# Patient Record
Sex: FEMALE | Race: WHITE | NOT HISPANIC OR LATINO | Employment: FULL TIME | URBAN - METROPOLITAN AREA
[De-identification: names, ages, dates, MRNs, and addresses within clinical notes are randomized per-mention and may not be internally consistent; named-entity substitution may affect disease eponyms.]

---

## 2023-07-31 LAB
RBC, URINE: 1 /HPF (ref 0–5)
SQUAMOUS EPITHELIAL CELLS, URINE: 7 /HPF
WBC, URINE: 5 /HPF (ref 0–5)

## 2023-08-01 LAB — URINE CULTURE: NO GROWTH

## 2024-01-29 DIAGNOSIS — I10 BENIGN HYPERTENSION: Primary | ICD-10-CM

## 2024-02-01 RX ORDER — LISINOPRIL 5 MG/1
5 TABLET ORAL DAILY
Qty: 90 TABLET | Refills: 0 | Status: SHIPPED | OUTPATIENT
Start: 2024-02-01 | End: 2024-04-29

## 2024-02-02 NOTE — TELEPHONE ENCOUNTER
Patient requesting medication refill for     Lisinopril 5mg     Pharmacy [facility] home delivery

## 2024-02-02 NOTE — TELEPHONE ENCOUNTER
Patient had referral for bone density test. Patient stated the cost of the test is too high. She wanted to know if there is any other test that would be cheaper similar to test referred.  She would like to know how important is this test?  If she can go without doing this test.

## 2024-02-28 ENCOUNTER — OFFICE VISIT (OUTPATIENT)
Dept: PRIMARY CARE | Facility: CLINIC | Age: 64
End: 2024-02-28
Payer: COMMERCIAL

## 2024-02-28 VITALS
SYSTOLIC BLOOD PRESSURE: 114 MMHG | HEIGHT: 62 IN | BODY MASS INDEX: 24.03 KG/M2 | RESPIRATION RATE: 16 BRPM | WEIGHT: 130.6 LBS | TEMPERATURE: 98.1 F | OXYGEN SATURATION: 95 % | DIASTOLIC BLOOD PRESSURE: 74 MMHG | HEART RATE: 84 BPM

## 2024-02-28 DIAGNOSIS — F17.200 SMOKER: ICD-10-CM

## 2024-02-28 DIAGNOSIS — M79.605 PAIN OF LEFT LOWER EXTREMITY: Primary | ICD-10-CM

## 2024-02-28 PROCEDURE — 99213 OFFICE O/P EST LOW 20 MIN: CPT | Performed by: NURSE PRACTITIONER

## 2024-02-28 PROCEDURE — 1126F AMNT PAIN NOTED NONE PRSNT: CPT | Performed by: NURSE PRACTITIONER

## 2024-02-28 PROCEDURE — 99213 OFFICE O/P EST LOW 20 MIN: CPT | Mod: ZK | Performed by: NURSE PRACTITIONER

## 2024-02-28 PROCEDURE — 1160F RVW MEDS BY RX/DR IN RCRD: CPT | Performed by: NURSE PRACTITIONER

## 2024-02-28 PROCEDURE — 1159F MED LIST DOCD IN RCRD: CPT | Performed by: NURSE PRACTITIONER

## 2024-02-28 RX ORDER — LANOLIN ALCOHOL/MO/W.PET/CERES
CREAM (GRAM) TOPICAL
COMMUNITY

## 2024-02-28 RX ORDER — IBUPROFEN 100 MG/5ML
1 SUSPENSION, ORAL (FINAL DOSE FORM) ORAL DAILY
COMMUNITY

## 2024-02-28 RX ORDER — ACETAMINOPHEN 500 MG
TABLET ORAL
COMMUNITY
Start: 2010-02-17

## 2024-02-28 ASSESSMENT — ENCOUNTER SYMPTOMS
OCCASIONAL FEELINGS OF UNSTEADINESS: 0
LOSS OF SENSATION IN FEET: 0
DEPRESSION: 0

## 2024-02-28 ASSESSMENT — LIFESTYLE VARIABLES
SKIP TO QUESTIONS 9-10: 1
HOW OFTEN DO YOU HAVE SIX OR MORE DRINKS ON ONE OCCASION: NEVER
AUDIT-C TOTAL SCORE: 1
HOW MANY STANDARD DRINKS CONTAINING ALCOHOL DO YOU HAVE ON A TYPICAL DAY: 1 OR 2
HOW OFTEN DO YOU HAVE A DRINK CONTAINING ALCOHOL: MONTHLY OR LESS

## 2024-02-28 ASSESSMENT — COLUMBIA-SUICIDE SEVERITY RATING SCALE - C-SSRS
2. HAVE YOU ACTUALLY HAD ANY THOUGHTS OF KILLING YOURSELF?: NO
6. HAVE YOU EVER DONE ANYTHING, STARTED TO DO ANYTHING, OR PREPARED TO DO ANYTHING TO END YOUR LIFE?: NO
1. IN THE PAST MONTH, HAVE YOU WISHED YOU WERE DEAD OR WISHED YOU COULD GO TO SLEEP AND NOT WAKE UP?: NO

## 2024-02-28 ASSESSMENT — PATIENT HEALTH QUESTIONNAIRE - PHQ9
1. LITTLE INTEREST OR PLEASURE IN DOING THINGS: NOT AT ALL
SUM OF ALL RESPONSES TO PHQ9 QUESTIONS 1 AND 2: 0
2. FEELING DOWN, DEPRESSED OR HOPELESS: NOT AT ALL

## 2024-02-28 ASSESSMENT — PAIN SCALES - GENERAL: PAINLEVEL: 0-NO PAIN

## 2024-02-28 NOTE — PROGRESS NOTES
Subjective   Patient ID: Lilly Nina is a 64 y.o. female who presents for Pain (Sick visit patient c/o pain to left lower leg. Patient denied fall , denied swelling, patient stated pain started couple months ago, its intermittent, it comes and goes.).  HPI 64-year-old female presents today for left lower leg pain scribed as intermittent flares of deep pain to the mid lateral shin area.  It has woken her up in the middle of the night.  Symptoms seem to be increasing in frequency given length of time.  Patient denies trauma.  No bruising redness open wounds or obvious deformity noted.    She is a smoker.  Concern raised for vascular issues.  States this is not like restless leg or muscle cramping.  She does use mustard as needed for muscle cramps.  States this is sharp and stabbing lasting several minutes.    Review of Systems  Review of systems: Present-feeling well. Not present-chills, fatigue and fever.  Skin: Not present-new lesions and rash.  HEENT: Not present-headache, ear pain, nasal congestion, and sore throat.  Neck: Not present-neck pain, neck stiffness and swollen glands.  Respiratory: Not present-difficulty breathing, cough, bloody sputum.  Cardiovascular: leg pain.  Not present-abnormal blood pressure, chest pain, edema, fainting,  leg swelling, palpitations, dyspnea on exertion, shortness of breath and slow heart rate.  Gastrointestinal: Not present-abdominal pain, bloody or very black stools, changes in bowel habits, heartburn, incontinence of stool, jaundice, nausea and vomiting.  Genitourinary: Not present-change in bladder habits, hematuria, flank pain, frequency, urinary incontinence, urgency and dysuria.  Musculoskeletal: Not present-back pain, claudication, joint pain, joint swelling, joint redness, and muscular weakness.  Neurological: Not present-dizziness, headache, trouble walking, unsteadiness, vertigo, weakness, numbness or tingling.  Objective   /74   Pulse 84   Temp 36.7 °C  "(98.1 °F) (Temporal)   Resp 16   Ht 1.575 m (5' 2\")   Wt 59.2 kg (130 lb 9.6 oz)   SpO2 95%   BMI 23.89 kg/m²      Physical Exam  Gen.: Mental status-alert. Gen. appearance-cooperative, well groomed and consistent with stated age. Not in acute distress or sickly. Orientation-oriented to time, place, purpose and person. Build and nutrition-well-nourished and well-developed. Hydration-well-hydrated.    Integumentary: General characteristics-overall examination the patient´s skin reveals-no suspicious lesions, no bruises and no evidence of scars. Color-normal coloration skin. Skin moisture-normal skin moisture.    Head and neck: Head-normocephalic, atraumatic with no lesions or palpable masses. Face-atraumatic. Neck-full range of motion and subtle. No lymphadenopathy and no nuchal rigidity. Thyroid-normal size and consistency with no palpable lumps.    Chest and lung exam: Auscultation-normal breath sounds, no adventitious lung sounds and normal vocal resonance. Chest wall is normal in shape and non-tender.    Cardiovascular:  Auscultation: Regular rate and rhythm. Heart sounds-normal heart sounds, S1-S2. No murmurs or gallops appreciated. Carotid arteries normal and without bruit.    Peripheral vascular: Lower extremity-inspection is normal bilaterally. Normal temperature and no edema bilaterally.    Musculoskeletal: Examination of the spine with no step-offs or point tenderness.  Full range of motion of the spine without pain or difficulty. Right lower extremity-normal strength and tone, normal range of motion without pain. Left lower extremity-normal strength and tone, normal range of motion without pain.  2+ pulses and reflexes.  No evidence of obvious deformity or redness or warmth or bruising.  Pain is not reproducible at this time.    Assessment/Plan   Diagnoses and all orders for this visit:  Pain of left lower extremity  -     Basic metabolic panel; Future  -     Magnesium; Future  -     Vascular US lower " extremity arterial duplex bilateral with LULA; Future  -     Vascular US lower extremity venous duplex bilateral; Future  Smoker  -     Basic metabolic panel; Future  -     Vascular US lower extremity arterial duplex bilateral with LULA; Future  -     Vascular US lower extremity venous duplex bilateral; Future

## 2024-03-01 NOTE — PATIENT INSTRUCTIONS
Unexplained left lower leg pain.  Pain is not present on exam today.  Is not reproducible.  There is no redness swelling warmth or obvious trauma.  Recommend BMP and magnesium levels.  She will be notified of results as they become available.  Lilly is a smoker.  She was encouraged to quit.  Vascular studies to be obtained.  She will be notified of results as they become available.    Follow-up in 1 to 2 weeks or sooner as needed.

## 2024-03-21 ENCOUNTER — LAB (OUTPATIENT)
Dept: LAB | Facility: LAB | Age: 64
End: 2024-03-21
Payer: COMMERCIAL

## 2024-03-21 ENCOUNTER — HOSPITAL ENCOUNTER (OUTPATIENT)
Dept: VASCULAR MEDICINE | Facility: HOSPITAL | Age: 64
Discharge: HOME | End: 2024-03-21
Payer: COMMERCIAL

## 2024-03-21 DIAGNOSIS — M79.605 PAIN OF LEFT LOWER EXTREMITY: ICD-10-CM

## 2024-03-21 DIAGNOSIS — F17.200 SMOKER: ICD-10-CM

## 2024-03-21 LAB
ANION GAP SERPL CALC-SCNC: 11 MMOL/L (ref 10–20)
BUN SERPL-MCNC: 13 MG/DL (ref 6–23)
CALCIUM SERPL-MCNC: 10 MG/DL (ref 8.6–10.3)
CHLORIDE SERPL-SCNC: 103 MMOL/L (ref 98–107)
CO2 SERPL-SCNC: 30 MMOL/L (ref 21–32)
CREAT SERPL-MCNC: 0.82 MG/DL (ref 0.5–1.05)
EGFRCR SERPLBLD CKD-EPI 2021: 79 ML/MIN/1.73M*2
GLUCOSE SERPL-MCNC: 86 MG/DL (ref 74–99)
MAGNESIUM SERPL-MCNC: 1.9 MG/DL (ref 1.6–2.4)
POTASSIUM SERPL-SCNC: 4.5 MMOL/L (ref 3.5–5.3)
SODIUM SERPL-SCNC: 139 MMOL/L (ref 136–145)

## 2024-03-21 PROCEDURE — 83735 ASSAY OF MAGNESIUM: CPT

## 2024-03-21 PROCEDURE — 93922 UPR/L XTREMITY ART 2 LEVELS: CPT

## 2024-03-21 PROCEDURE — 93922 UPR/L XTREMITY ART 2 LEVELS: CPT | Performed by: SURGERY

## 2024-03-21 PROCEDURE — 80048 BASIC METABOLIC PNL TOTAL CA: CPT

## 2024-03-21 PROCEDURE — 93970 EXTREMITY STUDY: CPT

## 2024-03-21 PROCEDURE — 93970 EXTREMITY STUDY: CPT | Performed by: SURGERY

## 2024-03-21 PROCEDURE — 36415 COLL VENOUS BLD VENIPUNCTURE: CPT

## 2024-07-29 DIAGNOSIS — I10 BENIGN HYPERTENSION: ICD-10-CM

## 2024-07-29 RX ORDER — LISINOPRIL 5 MG/1
5 TABLET ORAL DAILY
Qty: 90 TABLET | Refills: 0 | Status: SHIPPED | OUTPATIENT
Start: 2024-07-29

## 2024-12-11 ENCOUNTER — APPOINTMENT (OUTPATIENT)
Dept: PRIMARY CARE | Facility: CLINIC | Age: 64
End: 2024-12-11
Payer: COMMERCIAL

## 2025-01-27 DIAGNOSIS — I10 BENIGN HYPERTENSION: ICD-10-CM

## 2025-01-28 RX ORDER — LISINOPRIL 5 MG/1
5 TABLET ORAL DAILY
Qty: 90 TABLET | Refills: 0 | Status: SHIPPED | OUTPATIENT
Start: 2025-01-28

## 2025-02-06 ENCOUNTER — OFFICE VISIT (OUTPATIENT)
Dept: PRIMARY CARE | Facility: CLINIC | Age: 65
End: 2025-02-06
Payer: COMMERCIAL

## 2025-02-06 VITALS
WEIGHT: 108.8 LBS | DIASTOLIC BLOOD PRESSURE: 72 MMHG | RESPIRATION RATE: 16 BRPM | TEMPERATURE: 96.3 F | HEIGHT: 62 IN | SYSTOLIC BLOOD PRESSURE: 114 MMHG | OXYGEN SATURATION: 97 % | HEART RATE: 60 BPM | BODY MASS INDEX: 20.02 KG/M2

## 2025-02-06 DIAGNOSIS — Z12.31 SCREENING MAMMOGRAM FOR BREAST CANCER: ICD-10-CM

## 2025-02-06 DIAGNOSIS — Z00.00 ANNUAL PHYSICAL EXAM: Primary | ICD-10-CM

## 2025-02-06 DIAGNOSIS — Z13.89 SCREENING FOR BLOOD OR PROTEIN IN URINE: ICD-10-CM

## 2025-02-06 DIAGNOSIS — Z13.0 SCREENING FOR DEFICIENCY ANEMIA: ICD-10-CM

## 2025-02-06 DIAGNOSIS — Z13.220 LIPID SCREENING: ICD-10-CM

## 2025-02-06 DIAGNOSIS — R82.998 LEUKOCYTES IN URINE: ICD-10-CM

## 2025-02-06 DIAGNOSIS — Z11.59 ENCOUNTER FOR HEPATITIS C SCREENING TEST FOR LOW RISK PATIENT: ICD-10-CM

## 2025-02-06 DIAGNOSIS — M85.80 OSTEOPENIA AFTER MENOPAUSE: ICD-10-CM

## 2025-02-06 DIAGNOSIS — Z78.0 OSTEOPENIA AFTER MENOPAUSE: ICD-10-CM

## 2025-02-06 DIAGNOSIS — N39.41 URGE INCONTINENCE OF URINE: ICD-10-CM

## 2025-02-06 DIAGNOSIS — Z13.29 SCREENING FOR THYROID DISORDER: ICD-10-CM

## 2025-02-06 DIAGNOSIS — G47.9 DIFFICULTY SLEEPING: ICD-10-CM

## 2025-02-06 DIAGNOSIS — I10 BENIGN HYPERTENSION: ICD-10-CM

## 2025-02-06 DIAGNOSIS — Z13.1 SCREENING FOR DIABETES MELLITUS: ICD-10-CM

## 2025-02-06 DIAGNOSIS — F17.210 CIGARETTE SMOKER: ICD-10-CM

## 2025-02-06 PROBLEM — R73.01 IMPAIRED FASTING GLUCOSE: Status: ACTIVE | Noted: 2025-02-06

## 2025-02-06 PROBLEM — E78.5 HYPERLIPIDEMIA: Status: ACTIVE | Noted: 2017-11-07

## 2025-02-06 PROBLEM — F17.200 CURRENT SMOKER: Status: ACTIVE | Noted: 2025-02-06

## 2025-02-06 PROBLEM — G47.00 INSOMNIA: Status: ACTIVE | Noted: 2025-02-06

## 2025-02-06 LAB
POC APPEARANCE, URINE: CLEAR
POC BILIRUBIN, URINE: NEGATIVE
POC BLOOD, URINE: NEGATIVE
POC COLOR, URINE: YELLOW
POC GLUCOSE, URINE: NEGATIVE MG/DL
POC KETONES, URINE: NEGATIVE MG/DL
POC LEUKOCYTES, URINE: ABNORMAL
POC NITRITE,URINE: NEGATIVE
POC PH, URINE: 5.5 PH
POC PROTEIN, URINE: NEGATIVE MG/DL
POC SPECIFIC GRAVITY, URINE: <=1.005
POC UROBILINOGEN, URINE: 0.2 EU/DL

## 2025-02-06 PROCEDURE — 81003 URINALYSIS AUTO W/O SCOPE: CPT | Mod: QW | Performed by: NURSE PRACTITIONER

## 2025-02-06 PROCEDURE — 99214 OFFICE O/P EST MOD 30 MIN: CPT | Performed by: NURSE PRACTITIONER

## 2025-02-06 RX ORDER — TRAZODONE HYDROCHLORIDE 50 MG/1
50 TABLET ORAL NIGHTLY PRN
Qty: 30 TABLET | Refills: 0 | Status: SHIPPED | OUTPATIENT
Start: 2025-02-06 | End: 2026-02-06

## 2025-02-06 SDOH — ECONOMIC STABILITY: FOOD INSECURITY: WITHIN THE PAST 12 MONTHS, YOU WORRIED THAT YOUR FOOD WOULD RUN OUT BEFORE YOU GOT MONEY TO BUY MORE.: NEVER TRUE

## 2025-02-06 SDOH — ECONOMIC STABILITY: FOOD INSECURITY: WITHIN THE PAST 12 MONTHS, THE FOOD YOU BOUGHT JUST DIDN'T LAST AND YOU DIDN'T HAVE MONEY TO GET MORE.: NEVER TRUE

## 2025-02-06 ASSESSMENT — LIFESTYLE VARIABLES
HOW OFTEN DO YOU HAVE A DRINK CONTAINING ALCOHOL: MONTHLY OR LESS
HOW MANY STANDARD DRINKS CONTAINING ALCOHOL DO YOU HAVE ON A TYPICAL DAY: 1 OR 2
AUDIT-C TOTAL SCORE: 1
SKIP TO QUESTIONS 9-10: 1
HOW OFTEN DO YOU HAVE SIX OR MORE DRINKS ON ONE OCCASION: NEVER

## 2025-02-06 ASSESSMENT — COLUMBIA-SUICIDE SEVERITY RATING SCALE - C-SSRS
6. HAVE YOU EVER DONE ANYTHING, STARTED TO DO ANYTHING, OR PREPARED TO DO ANYTHING TO END YOUR LIFE?: NO
1. IN THE PAST MONTH, HAVE YOU WISHED YOU WERE DEAD OR WISHED YOU COULD GO TO SLEEP AND NOT WAKE UP?: NO
2. HAVE YOU ACTUALLY HAD ANY THOUGHTS OF KILLING YOURSELF?: NO

## 2025-02-06 ASSESSMENT — PATIENT HEALTH QUESTIONNAIRE - PHQ9
2. FEELING DOWN, DEPRESSED OR HOPELESS: NOT AT ALL
1. LITTLE INTEREST OR PLEASURE IN DOING THINGS: NOT AT ALL
SUM OF ALL RESPONSES TO PHQ9 QUESTIONS 1 AND 2: 0

## 2025-02-06 ASSESSMENT — ENCOUNTER SYMPTOMS
OCCASIONAL FEELINGS OF UNSTEADINESS: 0
DEPRESSION: 0
LOSS OF SENSATION IN FEET: 0

## 2025-02-06 ASSESSMENT — PAIN SCALES - GENERAL: PAINLEVEL_OUTOF10: 0-NO PAIN

## 2025-02-06 NOTE — PROGRESS NOTES
Subjective   Patient ID: Lilly Nina is a 65 y.o. female who presents for Annual Exam (Patient presents for annual exam visit today.).  HPI 65-year-old female with past medical history of hypertension, hyperlipidemia, impaired glucose tolerance, everyday smoker and insomnia presents today for annual physical exam.  Patient is using Unisom for sleep.  She is having difficulty sleeping due to increased stress with change in home.   She is requesting a different medication for sleep.  Options discussed.    Care team   Me     Last eye exam - within the year   Last dental exam - 6 months ago  Last mammogram - 140/19/2022  Pap test 2/24/22 - wnl hpv negative - no further per guidelines.  No history of abnormal paps,  1/19/2023 - colonoscopy - follow up 3 years   10/14/2021 bone density - osteopenia   Smoker 1 ppd since age 13   Not ready to quit   Low dose screen ordered     Trouble sleeping - falling asleep and staying asleep     Review of Systems  Review of systems: Present-feeling well. Not present-chills, fatigue and fever.  Skin: Not present- new lesions and rash.  HEENT: Not present-headache, ear pain, tinnitus, vertigo, seasonal allergies, nasal congestion, and sore throat.  Neck: Not present-neck pain, neck stiffness and swollen glands.  Respiratory: Not present-difficulty breathing, cough, bloody sputum.  Cardiovascular: Not present-abnormal blood pressure, chest pain, edema, palpitations, dyspnea on exertion.  Gastrointestinal: Not present-abdominal pain, bloody or very black stools, changes in bowel habits, heartburn, jaundice, nausea and vomiting.  Genitourinary: Not present-change in bladder habits, hematuria, flank pain and dysuria.  Musculoskeletal: Not present - joint pain, joint swelling, joint redness, and muscular weakness.  Neurological: Not present-dizziness, headache, numbness or tingling.  Psychiatric: difficulty sleeping.  Not present- panic attacks, suicidal ideation, suicidal planning,  "thoughts of hurting others and thoughts of self-harm.  Endocrine: Not present-appetite changes, polydipsia, polyuria, and thyroid problems.  Hematology:  Not present-anemia, excessive bleeding, bruising and epistaxis.    Objective   /72   Pulse 60   Temp 35.7 °C (96.3 °F) (Temporal)   Resp 16   Ht 1.575 m (5' 2\")   Wt 49.4 kg (108 lb 12.8 oz)   SpO2 97%   BMI 19.90 kg/m²      Physical Exam  Gen.: Mental status-alert. Gen. appearance-cooperative, well groomed and consistent with stated age. Not in acute distress or sickly. Orientation-oriented to time, place, purpose and person. Build and nutrition-well-nourished and well-developed. Hydration-well-hydrated.    Integumentary: Color-normal coloration skin. Skin moisture-normal skin moisture.    Head and neck: Head-normocephalic, atraumatic with no lesions or palpable masses. Face-atraumatic. Neck-full range of motion and subtle. No lymphadenopathy and no nuchal rigidity. Thyroid-normal size and consistency with no palpable lumps.    ENMT: Ears-no tenderness noted to the external auditory canal-bilaterally. Otoscopic exam: Tympanic membranes-left-tympanic membrane is gray in appearance. Right-tympanic membrane is gray in appearance. Nose -frontal sinuses-non-tender and no purulent drainage. Maxillary sinuses-non-tender and no purulent drainage. Mouth: Oral mucosa-pink and moist. Oropharynx: No airway distress, bulging of the pharyngeal wall, edema of the uvula, and no pharyngeal erythema.    Chest and lung exam: Auscultation-normal breath sounds, no adventitious lung sounds and normal vocal resonance. Chest wall is normal in shape and non-tender.    Cardiovascular: Auscultation: Regular rate and rhythm. Heart sounds-normal heart sounds, S1-S2. No murmurs or gallops appreciated. Carotid arteries normal and without bruit.    Abdomen: Non-tender, no rigidity, and no palpable masses. There is no hepatosplenomegaly. Auscultation-auscultation of the abdomen " reveals normal bowel sounds throughout.     Peripheral vascular: Normal temperature and no edema bilaterally.    Neurologic: Mental [id number]. Cranial nerves: Cranial nerves II through XII grossly intact. Normal gait.    Musculoskeletal: Examination of the spine with no step-offs or point tenderness.  Full range of motion of the spine without pain or difficulty. Right lower extremity-normal strength and tone, normal range of motion without pain. Left lower extremity-normal strength and tone, normal range of motion without pain.  Assessment/Plan   Diagnoses and all orders for this visit:  Annual physical exam  -     CBC; Future  -     Comprehensive Metabolic Panel; Future  -     Hemoglobin A1C; Future  -     Lipid Panel; Future  -     TSH with reflex to Free T4 if abnormal; Future  -     BI mammo bilateral screening tomosynthesis; Future  -     XR DEXA bone density; Future  -     CT lung screening low dose; Future  -     Hepatitis C antibody; Future  Urge incontinence of urine  -     Referral to Urogynecology; Future  -     Comprehensive Metabolic Panel; Future  -     Urine Culture  Leukocytes in urine  -     Urine Culture  Lipid screening  -     Lipid Panel; Future  Screening for thyroid disorder  -     TSH with reflex to Free T4 if abnormal; Future  Screening for diabetes mellitus  -     Comprehensive Metabolic Panel; Future  -     Hemoglobin A1C; Future  Screening for deficiency anemia  -     CBC; Future  Benign hypertension  -     CBC; Future  -     Comprehensive Metabolic Panel; Future  Screening mammogram for breast cancer  -     BI mammo bilateral screening tomosynthesis; Future  Osteopenia after menopause  -     XR DEXA bone density; Future  Cigarette smoker  -     CT lung screening low dose; Future  Encounter for hepatitis C screening test for low risk patient  -     Hepatitis C antibody; Future  Difficulty sleeping  -     traZODone (Desyrel) 50 mg tablet; Take 1 tablet (50 mg) by mouth as  needed at bedtime for sleep.  Screening for blood or protein in urine  -     POCT UA Automated manually resulted

## 2025-02-06 NOTE — PATIENT INSTRUCTIONS
AE with fasting labs to be obtained.   Bone density and mammogram requisition given.  Urge incontinence - referred to uro gyn  UA trace leuk - urine culture sent  She will be notified of results as they become available.     Hypertension - continue with current medical regime.   Continue to monitor blood pressure.   Dash diet recommended.     Difficulty sleeping/ anxious mood   good sleep hygiene and relaxation   see patient education.    Avoid alcohol and caffeine.   Prescription for trazodone as needed.     Follow up in a few weeks or sooner as needed.      She was encouraged to quit smoking - she is not ready at this time but will consider it.   Declines smoking aids and cessation program.

## 2025-02-08 LAB — BACTERIA UR CULT: NORMAL

## 2025-03-03 ENCOUNTER — OFFICE VISIT (OUTPATIENT)
Dept: PRIMARY CARE | Facility: CLINIC | Age: 65
End: 2025-03-03
Payer: COMMERCIAL

## 2025-03-03 VITALS
WEIGHT: 105.6 LBS | TEMPERATURE: 97.7 F | SYSTOLIC BLOOD PRESSURE: 116 MMHG | HEIGHT: 62 IN | BODY MASS INDEX: 19.43 KG/M2 | OXYGEN SATURATION: 98 % | DIASTOLIC BLOOD PRESSURE: 70 MMHG | HEART RATE: 79 BPM | RESPIRATION RATE: 10 BRPM

## 2025-03-03 DIAGNOSIS — G47.9 DIFFICULTY SLEEPING: Primary | ICD-10-CM

## 2025-03-03 DIAGNOSIS — E83.52 SERUM CALCIUM ELEVATED: ICD-10-CM

## 2025-03-03 PROBLEM — M16.9 OSTEOARTHRITIS OF HIP: Status: ACTIVE | Noted: 2025-03-03

## 2025-03-03 PROBLEM — R94.5 NONSPECIFIC ABNORMAL RESULTS OF LIVER FUNCTION STUDY: Status: ACTIVE | Noted: 2025-03-03

## 2025-03-03 PROBLEM — M54.9 CHRONIC BACK PAIN: Status: ACTIVE | Noted: 2025-03-03

## 2025-03-03 PROBLEM — R82.998 LEUKOCYTES IN URINE: Status: ACTIVE | Noted: 2025-03-03

## 2025-03-03 PROBLEM — L70.9 ACNE: Status: ACTIVE | Noted: 2025-03-03

## 2025-03-03 PROBLEM — J45.20 MILD INTERMITTENT ASTHMA WITHOUT COMPLICATION (HHS-HCC): Status: ACTIVE | Noted: 2017-11-07

## 2025-03-03 PROBLEM — G89.29 CHRONIC BACK PAIN: Status: ACTIVE | Noted: 2025-03-03

## 2025-03-03 PROBLEM — M25.559 ARTHRALGIA OF HIP: Status: ACTIVE | Noted: 2025-03-03

## 2025-03-03 PROBLEM — Z28.39 NOT UP TO DATE WITH SCHEDULED IMMUNIZATIONS: Status: ACTIVE | Noted: 2025-03-03

## 2025-03-03 PROBLEM — M85.89 OSTEOPENIA OF MULTIPLE SITES: Status: ACTIVE | Noted: 2017-11-07

## 2025-03-03 PROBLEM — E73.9 LACTOSE INTOLERANCE: Status: ACTIVE | Noted: 2025-03-03

## 2025-03-03 PROCEDURE — 1126F AMNT PAIN NOTED NONE PRSNT: CPT | Performed by: NURSE PRACTITIONER

## 2025-03-03 PROCEDURE — 99213 OFFICE O/P EST LOW 20 MIN: CPT | Performed by: NURSE PRACTITIONER

## 2025-03-03 PROCEDURE — 3074F SYST BP LT 130 MM HG: CPT | Performed by: NURSE PRACTITIONER

## 2025-03-03 PROCEDURE — 1159F MED LIST DOCD IN RCRD: CPT | Performed by: NURSE PRACTITIONER

## 2025-03-03 PROCEDURE — 3078F DIAST BP <80 MM HG: CPT | Performed by: NURSE PRACTITIONER

## 2025-03-03 PROCEDURE — 3008F BODY MASS INDEX DOCD: CPT | Performed by: NURSE PRACTITIONER

## 2025-03-03 RX ORDER — TRAZODONE HYDROCHLORIDE 100 MG/1
100 TABLET ORAL NIGHTLY PRN
Qty: 90 TABLET | Refills: 0 | Status: SHIPPED | OUTPATIENT
Start: 2025-03-03 | End: 2026-03-03

## 2025-03-03 ASSESSMENT — ENCOUNTER SYMPTOMS
OCCASIONAL FEELINGS OF UNSTEADINESS: 0
DEPRESSION: 0
LOSS OF SENSATION IN FEET: 0

## 2025-03-03 ASSESSMENT — PATIENT HEALTH QUESTIONNAIRE - PHQ9
SUM OF ALL RESPONSES TO PHQ9 QUESTIONS 1 & 2: 0
1. LITTLE INTEREST OR PLEASURE IN DOING THINGS: NOT AT ALL
2. FEELING DOWN, DEPRESSED OR HOPELESS: NOT AT ALL

## 2025-03-03 ASSESSMENT — PAIN SCALES - GENERAL: PAINLEVEL_OUTOF10: 0-NO PAIN

## 2025-03-03 NOTE — PATIENT INSTRUCTIONS
Difficulty sleeping-increased Trazodone to 100 mg as directed.    Good sleep hygiene discussed.  See patient education.     Elevated calcium level -- repeat calcium and pth - No multivitamin or  supplements prior to study.    She will be notified of results as they become available.

## 2025-03-03 NOTE — PROGRESS NOTES
"Subjective   Patient ID: Lilly Nina is a 65 y.o. female who presents for Follow-up (On trazadone and labs. States trazadone isn't helping.).  HPI 65-year-old female presents today as follow-up to trazodone.  Patient states she is still waking up at night occasionally. She has noticed some improvement.  She is interested in increasing her dose of trazodone.   She is avoiding caffeine and late night eating.     Review of Systems  Review of systems: Present-feeling well. Not present-chills, fatigue and fever.  Skin: Not present- new lesions and rash.  HEENT: Not present-headache, ear pain, nasal congestion, and sore throat.  Neck: Not present-neck pain, neck stiffness and swollen glands.  Respiratory: Not present-difficulty breathing, cough, bloody sputum.  Cardiovascular: Not present-abnormal blood pressure, chest pain, edema, dyspnea on exertion.  Gastrointestinal: Not present-abdominal pain, bloody or very black stools, changes in bowel habits, heartburn, nausea and vomiting.  Genitourinary: Not present-change in bladder habits, hematuria, flank pain and dysuria.  Musculoskeletal: Not present-joint pain, joint swelling, joint redness.  Neurological: Not present-dizziness, headache, numbness or tingling.  Psychiatric: difficulty staying asleep and anxious mood.  Not present-panic attacks, suicidal ideation, suicidal planning, thoughts of hurting others and thoughts of self-harm.  Endocrine: Not present-appetite changes, polydipsia, polyuria, and thyroid problems.  Hematology:  Not present-anemia, excessive bleeding, bruising and epistaxis.    Objective   /70 (BP Location: Left arm, Patient Position: Sitting)   Pulse 79   Temp 36.5 °C (97.7 °F) (Temporal)   Resp 10   Ht 1.575 m (5' 2\")   Wt 47.9 kg (105 lb 9.6 oz)   SpO2 98%   BMI 19.31 kg/m²      Physical Exam  Gen.: Mental status-alert. Gen. appearance-cooperative, well groomed and consistent with stated age. Not in acute distress or sickly. " Orientation-oriented to time, place, purpose and person. Build and nutrition-well-nourished and well-developed. Hydration-well-hydrated.    Integumentary: Color-normal coloration skin. Skin moisture-normal skin moisture.    Head and neck: Head-normocephalic, atraumatic with no lesions or palpable masses. Face-atraumatic. Neck-full range of motion and subtle. No lymphadenopathy and no nuchal rigidity.     Chest and lung exam: Chest and lung exam reveals-normal excursion with symmetric chest walls, quiet, even and easy respiratory effort with no use of accessory muscles.  Auscultation-normal breath sounds, no adventitious lung sounds and normal vocal resonance. Chest wall is normal in shape and non-tender.    Cardiovascular: Auscultation: Regular rate and rhythm. Heart sounds-normal heart sounds, S1-S2. No murmurs appreciated.     Assessment/Plan   Diagnoses and all orders for this visit:  Difficulty sleeping  -     traZODone (Desyrel) 100 mg tablet; Take 1 tablet (100 mg) by mouth as needed at bedtime for sleep.  Serum calcium elevated  -     Calcium; Future  -     PTH, intact; Future

## 2025-03-19 ENCOUNTER — TELEPHONE (OUTPATIENT)
Dept: PRIMARY CARE | Facility: CLINIC | Age: 65
End: 2025-03-19
Payer: COMMERCIAL

## 2025-03-19 ENCOUNTER — HOSPITAL ENCOUNTER (OUTPATIENT)
Dept: RADIOLOGY | Facility: CLINIC | Age: 65
Discharge: HOME | End: 2025-03-19
Payer: COMMERCIAL

## 2025-03-19 ENCOUNTER — APPOINTMENT (OUTPATIENT)
Dept: RADIOLOGY | Facility: CLINIC | Age: 65
End: 2025-03-19
Payer: COMMERCIAL

## 2025-03-19 VITALS — WEIGHT: 105.6 LBS | HEIGHT: 62 IN | BODY MASS INDEX: 19.43 KG/M2

## 2025-03-19 DIAGNOSIS — D38.1 NEOPLASM OF UNCERTAIN BEHAVIOR OF LUNG: ICD-10-CM

## 2025-03-19 DIAGNOSIS — Z12.31 SCREENING MAMMOGRAM FOR BREAST CANCER: ICD-10-CM

## 2025-03-19 DIAGNOSIS — F17.210 CIGARETTE SMOKER: Primary | ICD-10-CM

## 2025-03-19 DIAGNOSIS — Z00.00 ANNUAL PHYSICAL EXAM: ICD-10-CM

## 2025-03-19 DIAGNOSIS — F17.210 CIGARETTE SMOKER: ICD-10-CM

## 2025-03-19 DIAGNOSIS — I31.39 PERICARDIAL EFFUSION (HHS-HCC): ICD-10-CM

## 2025-03-19 DIAGNOSIS — R91.8 LUNG MASS: ICD-10-CM

## 2025-03-19 PROCEDURE — 77063 BREAST TOMOSYNTHESIS BI: CPT | Performed by: RADIOLOGY

## 2025-03-19 PROCEDURE — 71271 CT THORAX LUNG CANCER SCR C-: CPT

## 2025-03-19 PROCEDURE — 77067 SCR MAMMO BI INCL CAD: CPT | Performed by: RADIOLOGY

## 2025-03-19 PROCEDURE — 77063 BREAST TOMOSYNTHESIS BI: CPT

## 2025-03-19 NOTE — TELEPHONE ENCOUNTER
Spoke with Lilly re: ct chest low dose screen.    There is mild bilateral upper lung predominant centrilobular  emphysematous change. There is an ovoid juxtapleural consolidation in the anterior left lower lobe measuring 3.7 x 3.4 cm (series 4, image 195) with spiculated margins (series 602, image 71) slight retraction of the adjacent bronchial segments, relatively acute angle to the pleura. The mass abuts the left pericardium. There is no other focal consolidation, no pleural effusion, or pneumothorax.      There are few bilateral noncalcified pulmonary nodules seen. For  example,measuring up to 0.2 cm in the posterior right lower lobe  (series 4, image 160). There has also a 0.5 cm nodule in the right  upper lobe on series 4, image 58    Small pericardial effusion. Consider further evaluation with  echocardiography. Given that the above mass abuts the pericardial  surface, underlying pericardial involvement is not excluded.    Estimated coronary artery calcium score is 126*    Referral placed for thoracic surgery and cardiology   PET CT Chest lung cancer initial screen ordered.

## 2025-03-20 ENCOUNTER — HOSPITAL ENCOUNTER (OUTPATIENT)
Dept: RADIOLOGY | Facility: EXTERNAL LOCATION | Age: 65
Discharge: HOME | End: 2025-03-20

## 2025-03-20 ENCOUNTER — TELEPHONE (OUTPATIENT)
Dept: RADIOLOGY | Facility: HOSPITAL | Age: 65
End: 2025-03-20
Payer: COMMERCIAL

## 2025-03-20 NOTE — PROGRESS NOTES
Referred by: Carlitos GOULD     PCP  MARGARET Hernandez-CNP         CHIEF COMPLAINT:  urinary incontinence         HISTORY OF PRESENT ILLNESS:  This is a  65 y.o. y.o. female who presents with urinary incontinence, sometimes with sneeze or cough, sometimes no warning and just leaks (this is a frequent issue)    The following were reviewed to gain additional history:  External notes: [facility] note 25, UUI  Test results: Cr 0.83 25, A1c 5.9% 25         Specifically, she describes the following pelvic floor symptoms:          Prolapse: Yes, but not very bothersome       - Splinting to urinate: No       - Splinting for bowel movement/stool trapping: No              Incontinence:  Yes, wears pad, bathroom mapping             Mixed              Urinary Symptoms:       - Frequency:  Yes, but drinks a lot             # Voids:         - Nocturia: Yes             # Voids:  0-1       - Urgency:  Yes       - Incomplete emptying:  No       - Hesitancy:  No       - Pain with voiding:  No       - Excessive fluid intake: Yes - water and diet pepsi throughout the day               History:       - Recurrent UTI:  No       - Hematuria:  No       - Stones:  No       - Kidney Disease:  No                  Bowel Symptoms:       - Regular: Yes       - Diarrhea:No       - Constipation: No       - Fecal Incontinence:  No       - Flatus Incontinence:  No       - Fecal urgency:   No    Past medical and surgical hx reviewed - pertinent for   PMH HTN  PSH hip replacement, carpal tunnel  Smoking history: current daily 1 ppd x 50+ years        Gyn History:  - Postmenopause: Yes           Postmenopausal bleeding: No  - HRT: No  - Pap up to date: Yes -   - Sexually active:  No  - Number of prior vaginal deliveries: 2   Number of prior operative deliveries: 0   Prior OASI? 0  - Number of prior c-sections: 0    - Mammogram up to date: Yes  - Colonoscopy up to date: Yes    OB History          2    Para   2    Term   2     "        AB        Living             SAB        IAB        Ectopic        Multiple        Live Births                           PHYSICAL EXAMINATION:  No LMP recorded. Patient is postmenopausal.  Body mass index is 19.2 kg/m².  /70   Pulse 78   Temp 36.9 °C (98.4 °F) (Temporal)   Resp 16   Ht 1.575 m (5' 2\")   Wt 47.6 kg (105 lb)   SpO2 100%   BMI 19.20 kg/m²     General Appearance: well appearing  Neuro: Alert and oriented   HEENT: mucous membranes moist, neck supple  Resp: No respiratory distress, normal work of breathing  MSK: normal range of motion, gait appropriate    Pelvic:  Genitourinary: normal external genitalia, Bartholin's glands negative, S[place]ie's glands negative  Urethra: normal meatus, non-tender, no periurethral mass  Vaginal mucosa  normal  Cervix  normal  Uterus  normal size, non-tender, mobile  Adnexae  negative nontender, no masses  Atrophy negative    CST negative      POP-Q (in supine position):  No significant prolapse    Rectal: no hemorrhoids, fissures or masses    PVR (by Ultrasound): 0 ml         IMPRESSION AND PLAN:  Lilly SANCHES Kelle is a 65 y.o. who presents with ÁNGEL    WILDER  - discussed etiology of WILDER and potential risk factors  - reviewed management strategies including PFPT, anti-incontinence ring, urethral bulking injections and midurethral sling placement  - opting for PFPT, referral made today and provided list of PFPT locations in the area    OAB  - discussed etiology of OAB and potential management options  - reviewed bladder health recommendations (fluid intake, avoiding bladder triggers)  - discussed treatment options: PFPT, medications, PTNS, intradetrusor botox, SNM  - opting for PFPT as above        All questions and concerns were answered and addressed.  The patient expressed understanding and agrees with the plan.     RTC as needed (offered standing follow up but she prefers to call back if she would like to pursue further management)    3/21/2025   "

## 2025-03-20 NOTE — TELEPHONE ENCOUNTER
Call placed to the patient to provide her with contact information of the lung health team if she needs anything or has any questions.

## 2025-03-21 ENCOUNTER — OFFICE VISIT (OUTPATIENT)
Dept: OBSTETRICS AND GYNECOLOGY | Facility: CLINIC | Age: 65
End: 2025-03-21
Payer: COMMERCIAL

## 2025-03-21 VITALS
HEART RATE: 78 BPM | RESPIRATION RATE: 16 BRPM | TEMPERATURE: 98.4 F | DIASTOLIC BLOOD PRESSURE: 70 MMHG | OXYGEN SATURATION: 100 % | BODY MASS INDEX: 19.32 KG/M2 | SYSTOLIC BLOOD PRESSURE: 104 MMHG | WEIGHT: 105 LBS | HEIGHT: 62 IN

## 2025-03-21 DIAGNOSIS — N39.41 URGE INCONTINENCE OF URINE: Primary | ICD-10-CM

## 2025-03-21 DIAGNOSIS — N39.3 SUI (STRESS URINARY INCONTINENCE, FEMALE): ICD-10-CM

## 2025-03-21 DIAGNOSIS — Z13.89 SCREENING FOR BLOOD OR PROTEIN IN URINE: ICD-10-CM

## 2025-03-21 LAB
POC APPEARANCE, URINE: CLEAR
POC BILIRUBIN, URINE: NEGATIVE
POC BLOOD, URINE: ABNORMAL
POC COLOR, URINE: YELLOW
POC GLUCOSE, URINE: NEGATIVE MG/DL
POC KETONES, URINE: NEGATIVE MG/DL
POC LEUKOCYTES, URINE: NEGATIVE
POC NITRITE,URINE: NEGATIVE
POC PH, URINE: 6 PH
POC PROTEIN, URINE: NEGATIVE MG/DL
POC SPECIFIC GRAVITY, URINE: 1.01
POC UROBILINOGEN, URINE: 0.2 EU/DL

## 2025-03-21 PROCEDURE — 81003 URINALYSIS AUTO W/O SCOPE: CPT | Mod: QW | Performed by: STUDENT IN AN ORGANIZED HEALTH CARE EDUCATION/TRAINING PROGRAM

## 2025-03-21 PROCEDURE — 3074F SYST BP LT 130 MM HG: CPT | Performed by: STUDENT IN AN ORGANIZED HEALTH CARE EDUCATION/TRAINING PROGRAM

## 2025-03-21 PROCEDURE — 3008F BODY MASS INDEX DOCD: CPT | Performed by: STUDENT IN AN ORGANIZED HEALTH CARE EDUCATION/TRAINING PROGRAM

## 2025-03-21 PROCEDURE — 3078F DIAST BP <80 MM HG: CPT | Performed by: STUDENT IN AN ORGANIZED HEALTH CARE EDUCATION/TRAINING PROGRAM

## 2025-03-21 PROCEDURE — 1126F AMNT PAIN NOTED NONE PRSNT: CPT | Performed by: STUDENT IN AN ORGANIZED HEALTH CARE EDUCATION/TRAINING PROGRAM

## 2025-03-21 PROCEDURE — 99214 OFFICE O/P EST MOD 30 MIN: CPT | Performed by: STUDENT IN AN ORGANIZED HEALTH CARE EDUCATION/TRAINING PROGRAM

## 2025-03-21 PROCEDURE — 99204 OFFICE O/P NEW MOD 45 MIN: CPT | Performed by: STUDENT IN AN ORGANIZED HEALTH CARE EDUCATION/TRAINING PROGRAM

## 2025-03-21 PROCEDURE — 1159F MED LIST DOCD IN RCRD: CPT | Performed by: STUDENT IN AN ORGANIZED HEALTH CARE EDUCATION/TRAINING PROGRAM

## 2025-03-21 ASSESSMENT — ENCOUNTER SYMPTOMS
DEPRESSION: 0
LOSS OF SENSATION IN FEET: 0
OCCASIONAL FEELINGS OF UNSTEADINESS: 0

## 2025-03-21 ASSESSMENT — PAIN SCALES - GENERAL: PAINLEVEL_OUTOF10: 0-NO PAIN

## 2025-03-26 ENCOUNTER — HOSPITAL ENCOUNTER (OUTPATIENT)
Dept: RADIOLOGY | Facility: CLINIC | Age: 65
Discharge: HOME | End: 2025-03-26
Payer: COMMERCIAL

## 2025-03-26 DIAGNOSIS — R91.8 LUNG MASS: ICD-10-CM

## 2025-03-26 DIAGNOSIS — F17.210 CIGARETTE SMOKER: ICD-10-CM

## 2025-03-26 DIAGNOSIS — D38.1 NEOPLASM OF UNCERTAIN BEHAVIOR OF LUNG: ICD-10-CM

## 2025-03-26 LAB — GLUCOSE BLD MANUAL STRIP-MCNC: 79 MG/DL (ref 74–99)

## 2025-03-26 PROCEDURE — 82947 ASSAY GLUCOSE BLOOD QUANT: CPT

## 2025-03-26 PROCEDURE — A9552 F18 FDG: HCPCS | Performed by: NURSE PRACTITIONER

## 2025-03-26 PROCEDURE — 3430000001 HC RX 343 DIAGNOSTIC RADIOPHARMACEUTICALS: Performed by: NURSE PRACTITIONER

## 2025-03-26 PROCEDURE — 78815 PET IMAGE W/CT SKULL-THIGH: CPT | Mod: PI

## 2025-03-26 PROCEDURE — 78815 PET IMAGE W/CT SKULL-THIGH: CPT | Mod: PET TUMOR INIT TX STRAT | Performed by: RADIOLOGY

## 2025-03-26 RX ORDER — FLUDEOXYGLUCOSE F 18 200 MCI/ML
11.21 INJECTION, SOLUTION INTRAVENOUS
Status: COMPLETED | OUTPATIENT
Start: 2025-03-26 | End: 2025-03-26

## 2025-03-26 RX ADMIN — FLUDEOXYGLUCOSE F 18 11.21 MILLICURIE: 200 INJECTION, SOLUTION INTRAVENOUS at 10:31

## 2025-04-02 ENCOUNTER — OFFICE VISIT (OUTPATIENT)
Dept: SURGERY | Facility: HOSPITAL | Age: 65
End: 2025-04-02
Payer: COMMERCIAL

## 2025-04-02 ENCOUNTER — TELEPHONE (OUTPATIENT)
Dept: PRIMARY CARE | Facility: CLINIC | Age: 65
End: 2025-04-02

## 2025-04-02 VITALS
HEART RATE: 89 BPM | RESPIRATION RATE: 16 BRPM | OXYGEN SATURATION: 100 % | DIASTOLIC BLOOD PRESSURE: 58 MMHG | WEIGHT: 102.07 LBS | SYSTOLIC BLOOD PRESSURE: 111 MMHG | BODY MASS INDEX: 18.67 KG/M2 | TEMPERATURE: 97.7 F

## 2025-04-02 DIAGNOSIS — F17.210 CIGARETTE SMOKER: ICD-10-CM

## 2025-04-02 DIAGNOSIS — D38.1 NEOPLASM OF UNCERTAIN BEHAVIOR OF LUNG: ICD-10-CM

## 2025-04-02 DIAGNOSIS — R91.8 LUNG MASS: ICD-10-CM

## 2025-04-02 PROCEDURE — 99215 OFFICE O/P EST HI 40 MIN: CPT | Performed by: STUDENT IN AN ORGANIZED HEALTH CARE EDUCATION/TRAINING PROGRAM

## 2025-04-02 PROCEDURE — 3074F SYST BP LT 130 MM HG: CPT | Performed by: STUDENT IN AN ORGANIZED HEALTH CARE EDUCATION/TRAINING PROGRAM

## 2025-04-02 PROCEDURE — 1159F MED LIST DOCD IN RCRD: CPT | Performed by: STUDENT IN AN ORGANIZED HEALTH CARE EDUCATION/TRAINING PROGRAM

## 2025-04-02 PROCEDURE — 3078F DIAST BP <80 MM HG: CPT | Performed by: STUDENT IN AN ORGANIZED HEALTH CARE EDUCATION/TRAINING PROGRAM

## 2025-04-02 PROCEDURE — 99205 OFFICE O/P NEW HI 60 MIN: CPT | Performed by: STUDENT IN AN ORGANIZED HEALTH CARE EDUCATION/TRAINING PROGRAM

## 2025-04-02 PROCEDURE — G2211 COMPLEX E/M VISIT ADD ON: HCPCS | Performed by: STUDENT IN AN ORGANIZED HEALTH CARE EDUCATION/TRAINING PROGRAM

## 2025-04-02 PROCEDURE — 4004F PT TOBACCO SCREEN RCVD TLK: CPT | Performed by: STUDENT IN AN ORGANIZED HEALTH CARE EDUCATION/TRAINING PROGRAM

## 2025-04-02 PROCEDURE — 1126F AMNT PAIN NOTED NONE PRSNT: CPT | Performed by: STUDENT IN AN ORGANIZED HEALTH CARE EDUCATION/TRAINING PROGRAM

## 2025-04-02 PROCEDURE — 1160F RVW MEDS BY RX/DR IN RCRD: CPT | Performed by: STUDENT IN AN ORGANIZED HEALTH CARE EDUCATION/TRAINING PROGRAM

## 2025-04-02 ASSESSMENT — PATIENT HEALTH QUESTIONNAIRE - PHQ9
SUM OF ALL RESPONSES TO PHQ9 QUESTIONS 1 AND 2: 0
1. LITTLE INTEREST OR PLEASURE IN DOING THINGS: NOT AT ALL
2. FEELING DOWN, DEPRESSED OR HOPELESS: NOT AT ALL

## 2025-04-02 ASSESSMENT — PAIN SCALES - GENERAL: PAINLEVEL_OUTOF10: 0-NO PAIN

## 2025-04-02 NOTE — PROGRESS NOTES
HPI:   Lilly Nina is a 65 y.o. female with a pmhx of HTN, HLD, and current smoker who is referred to me by Ms Urbina for evaluation and treatment of lung mass. The lung mass is found incidentally on screening CT scan. Patient is doing well clinically. She is active and denied shortness of breath or dyspnea on exertion. Her weight has been stable. She denied history of MI or stroke. She is current smoker about 1 pack per day for 40 years. She has not tried any therapy to help her quit but is willing to try. She denied recent illness, fever, chills, chest pain, chest pressure, nausea or emesis.     PMHx: per HPI  PSHx: hip replacement trigger finger release  SHx: current smoker (40ppy), social ETOH, deny illicit drugs   FMHx: negative for history of cancer. Biological mother side had unknown cardiac disease    Current Outpatient Medications:     ascorbic acid (Vitamin C) 1,000 mg tablet, Take 1 tablet (1,000 mg) by mouth once daily., Disp: , Rfl:     cholecalciferol (Vitamin D-3) 50 mcg (2,000 unit) capsule, Take by mouth., Disp: , Rfl:     cyanocobalamin (Vitamin B-12) 1,000 mcg tablet, Take by mouth., Disp: , Rfl:     doxylamine (Unisom, doxylamine,) 25 mg tablet, Take 1 tablet (25 mg) by mouth once daily at bedtime., Disp: , Rfl:     lisinopril 5 mg tablet, TAKE 1 TABLET DAILY, Disp: 90 tablet, Rfl: 0    traZODone (Desyrel) 100 mg tablet, Take 1 tablet (100 mg) by mouth as needed at bedtime for sleep., Disp: 90 tablet, Rfl: 0   No Known Allergies     ROS  General: negative for fever, chills, weight loss, night sweat  Head: negative for severe headache, vision change, blurred vision,   CV: negative for chest pain, dizziness, lightheadedness   Pulm: negative for shortness of breath, dyspnea on exertion, hemoptysis  GI: negative for diarrhea, constipation, abdominal pain, nausea or vomiting, BRBPR  : negative for dysuria, hematuria, incontinence  Skin: negative for rash  Heme: negative for blood thinner,  bleeding disorder or clotting disorder  Endo: negative for heat or cold intolerance, weight gain or weight loss  MSK: negative for rash, edema, weakness    PHYSICAL EXAM  Visit Vitals  /58 (BP Location: Left arm, Patient Position: Sitting, BP Cuff Size: Small adult)   Pulse 89   Temp 36.5 °C (97.7 °F) (Temporal)   Resp 16   Constitution: well-developed well-nourished female sitting in chair in no acute distress  HEENT: NCAT, moist mucosal membrane, neck supple, no crepitus, sclera anicteric  Lymph nodes: no cervical or supraclavicular lymphadenopathy  Cardiac: RRR, normal S1, S2, no mrg  Pulmonary: normal air movement, CTAP, no wcr  Abdomen: soft, non-distended, non-tender, no rigidity, guarding or rebound tenderness, no splenohepatomegaly  Neuro: AOx3, CNII-XII grossly normal  Ext: warm, dry, no edema noted  Skin: dry, clean and intact  Psych: mood and affect wnl    Assessment and Plan:  This is a 65 y.o. female current smoker with new found lung mass  I reviewed the CT scan from 3/19/25 and the PET/CT from 3/26/25. It showed LLL centrally located spiculated lung mass about 37mm in size. This mass is strongly FDG avid with mSUV of 13.  --no mediastinal lymphadenopathy noted  --no distant or mediastinal FDG avidity noted  I reviewed the labs from 2/20/25. It showed normal H/H and hypercalcemia.    In my opinion, this high risk patient presents with a LLL lung mass. This is highly concerning for primary lung cancer. Imaging workup so far showed localized disease in the LLL. I recommended EBUS with navigation bronch to obtain a biopsy and mediastinal staging. If cancer is confirmed on EBUS, she will need a brain MRI to complete staging. I will also obtain a pulmonary function test to assess baseline lung function. I will bring her back to discuss results. We also discussed briefly regarding lung cancer. If localized disease is confirmed, surgical resection is treatment of choice with good long term cure rate. If  advanced lung cancer is confirmed, she will need a combination of chemotherapy with possible surgical resection.     We spent 5 minutes today discussing the patient's current 1 pack per day cigarette dependence; the effects of smoking on cancer treatment and recovery for lung surgery; the effect on developing new cancers; and a counseling plan for quitting. After discussing pharmacotherapy options, the patient elected to begin nicotine patch and use the gradual quit approach.    I had a candid discussion with the patient. I outlined the treatment plan as above. The patient consented to proceed.     Florencio Morrow MD  Thoracic Surgeon  [facility]   of Medicine  [facility]  Office phone: [phone]  Fax: [phone]  Pager: [phone]    Amount of time spent:  -Prep time on date of patient encounter: 20 min  -Time spend with patient/family/caregiver: 30 min  -Additional time spent on patient care activities: 15 min  -Documentation time in medical record: 15 min  -Other time spent on date of patient encounter: 0 min  Total time: 80 min

## 2025-04-02 NOTE — LETTER
April 2, 2025     Gayle Urbina, APRN-CNP  [address]  [facility] [address]    Patient: Lilly Nina   YOB: 1960   Date of Visit: 4/2/2025       Dear Dr. Gayle Urbina, APRN-CNP:    Thank you for referring Lilly Nina to me for evaluation. I am concerned that her lung mass represents primary lung cancer. I will obtain biopsy and discuss treatment afterwards. If you have questions, please do not hesitate to call me. Thank you for involving me in the care of this patient.       Sincerely,     Florencio Morrow MD  [phone]    CC: No Recipients  ______________________________________________________________________________________    HPI:   Lilly Nina is a 65 y.o. female with a pmhx of HTN, HLD, and current smoker who is referred to me by Ms Urbina for evaluation and treatment of lung mass. The lung mass is found incidentally on screening CT scan. Patient is doing well clinically. She is active and denied shortness of breath or dyspnea on exertion. Her weight has been stable. She denied history of MI or stroke. She is current smoker about 1 pack per day for 40 years. She has not tried any therapy to help her quit but is willing to try. She denied recent illness, fever, chills, chest pain, chest pressure, nausea or emesis.     PMHx: per HPI  PSHx: hip replacement trigger finger release  SHx: current smoker (40ppy), social ETOH, deny illicit drugs   FMHx: negative for history of cancer. Biological mother side had unknown cardiac disease    Current Outpatient Medications:   •  ascorbic acid (Vitamin C) 1,000 mg tablet, Take 1 tablet (1,000 mg) by mouth once daily., Disp: , Rfl:   •  cholecalciferol (Vitamin D-3) 50 mcg (2,000 unit) capsule, Take by mouth., Disp: , Rfl:   •  cyanocobalamin (Vitamin B-12) 1,000 mcg tablet, Take by mouth., Disp: , Rfl:   •  doxylamine (Unisom, doxylamine,) 25 mg tablet, Take 1 tablet (25 mg) by mouth once daily at bedtime., Disp: ,  Rfl:   •  lisinopril 5 mg tablet, TAKE 1 TABLET DAILY, Disp: 90 tablet, Rfl: 0  •  traZODone (Desyrel) 100 mg tablet, Take 1 tablet (100 mg) by mouth as needed at bedtime for sleep., Disp: 90 tablet, Rfl: 0   No Known Allergies     ROS  General: negative for fever, chills, weight loss, night sweat  Head: negative for severe headache, vision change, blurred vision,   CV: negative for chest pain, dizziness, lightheadedness   Pulm: negative for shortness of breath, dyspnea on exertion, hemoptysis  GI: negative for diarrhea, constipation, abdominal pain, nausea or vomiting, BRBPR  : negative for dysuria, hematuria, incontinence  Skin: negative for rash  Heme: negative for blood thinner, bleeding disorder or clotting disorder  Endo: negative for heat or cold intolerance, weight gain or weight loss  MSK: negative for rash, edema, weakness    PHYSICAL EXAM  Visit Vitals  /58 (BP Location: Left arm, Patient Position: Sitting, BP Cuff Size: Small adult)   Pulse 89   Temp 36.5 °C (97.7 °F) (Temporal)   Resp 16   Constitution: well-developed well-nourished female sitting in chair in no acute distress  HEENT: NCAT, moist mucosal membrane, neck supple, no crepitus, sclera anicteric  Lymph nodes: no cervical or supraclavicular lymphadenopathy  Cardiac: RRR, normal S1, S2, no mrg  Pulmonary: normal air movement, CTAP, no wcr  Abdomen: soft, non-distended, non-tender, no rigidity, guarding or rebound tenderness, no splenohepatomegaly  Neuro: AOx3, CNII-XII grossly normal  Ext: warm, dry, no edema noted  Skin: dry, clean and intact  Psych: mood and affect wnl    Assessment and Plan:  This is a 65 y.o. female current smoker with new found lung mass  I reviewed the CT scan from 3/19/25 and the PET/CT from 3/26/25. It showed LLL centrally located spiculated lung mass about 37mm in size. This mass is strongly FDG avid with mSUV of 13.  --no mediastinal lymphadenopathy noted  --no distant or mediastinal FDG avidity noted  I  reviewed the labs from 2/20/25. It showed normal H/H and hypercalcemia.    In my opinion, this high risk patient presents with a LLL lung mass. This is highly concerning for primary lung cancer. Imaging workup so far showed localized disease in the LLL. I recommended EBUS with navigation bronch to obtain a biopsy and mediastinal staging. If cancer is confirmed on EBUS, she will need a brain MRI to complete staging. I will also obtain a pulmonary function test to assess baseline lung function. I will bring her back to discuss results. We also discussed briefly regarding lung cancer. If localized disease is confirmed, surgical resection is treatment of choice with good long term cure rate. If advanced lung cancer is confirmed, she will need a combination of chemotherapy with possible surgical resection.     We spent 5 minutes today discussing the patient's current 1 pack per day cigarette dependence; the effects of smoking on cancer treatment and recovery for lung surgery; the effect on developing new cancers; and a counseling plan for quitting. After discussing pharmacotherapy options, the patient elected to begin nicotine patch and use the gradual quit approach.    I had a candid discussion with the patient. I outlined the treatment plan as above. The patient consented to proceed.     Florencio Morrow MD  Thoracic Surgeon  [facility]   of Medicine  [facility]  Office phone: [phone]  Fax: [phone]  Pager: [phone]    Amount of time spent:  -Prep time on date of patient encounter: 20 min  -Time spend with patient/family/caregiver: 30 min  -Additional time spent on patient care activities: 15 min  -Documentation time in medical record: 15 min  -Other time spent on date of patient encounter: 0 min  Total time: 80 min

## 2025-04-03 ENCOUNTER — HOSPITAL ENCOUNTER (OUTPATIENT)
Dept: RADIOLOGY | Facility: CLINIC | Age: 65
Discharge: HOME | End: 2025-04-03
Payer: COMMERCIAL

## 2025-04-03 ENCOUNTER — APPOINTMENT (OUTPATIENT)
Dept: RADIOLOGY | Facility: CLINIC | Age: 65
End: 2025-04-03
Payer: COMMERCIAL

## 2025-04-03 DIAGNOSIS — Z00.00 ANNUAL PHYSICAL EXAM: ICD-10-CM

## 2025-04-03 DIAGNOSIS — Z01.818 PRE-OP TESTING: ICD-10-CM

## 2025-04-03 DIAGNOSIS — R91.8 LUNG MASS: ICD-10-CM

## 2025-04-03 DIAGNOSIS — Z78.0 OSTEOPENIA AFTER MENOPAUSE: ICD-10-CM

## 2025-04-03 DIAGNOSIS — R91.8 LUNG MASS: Primary | ICD-10-CM

## 2025-04-03 DIAGNOSIS — F17.210 CIGARETTE SMOKER: Primary | ICD-10-CM

## 2025-04-03 DIAGNOSIS — M85.80 OSTEOPENIA AFTER MENOPAUSE: ICD-10-CM

## 2025-04-03 PROCEDURE — 77080 DXA BONE DENSITY AXIAL: CPT | Performed by: RADIOLOGY

## 2025-04-03 PROCEDURE — 77080 DXA BONE DENSITY AXIAL: CPT

## 2025-04-03 RX ORDER — IBUPROFEN 200 MG
1 TABLET ORAL EVERY 24 HOURS
Qty: 30 PATCH | Refills: 0 | Status: SHIPPED | OUTPATIENT
Start: 2025-04-03 | End: 2025-05-03

## 2025-04-03 NOTE — PROGRESS NOTES
Bronchoscopy Scheduling Request    Pre-bronchoscopy visit: New patient visit with Bronchoscopy group provider  Please schedule procedure: Next available    Cytology on-site:  Yes  Location:  Either location  Performing physician:  Advanced diagnostic bronchoscopist  Referring physician:  Florencio Morrow MD, Gayle Urbina, APRN-CNP  Indication:  PET-avid left lower lobe lung mass; significant smoking history  Sedation / Anesthesia:  GA  Procedure:  TBNA, TBBx, Navigational bronchoscopy, Radial EBUS, Staging EBUS  Time:  Tier 3  Fluorscopy:   Yes  Imaging needed:  CT Elio - same day as procedure  Labs:  CBC, BMP (labs from 02/2025 [scanned] show mild hyperCa2+ at 10.3)  Meds:  None  Special Considerations:   Should be able to find it with EWC, RP-EBUS, and fluoroscopy; CT-Elio ordered in case needed  Reviewed by:  Grady Amaya MD on 04/03/25

## 2025-04-07 ENCOUNTER — OFFICE VISIT (OUTPATIENT)
Dept: CARDIOLOGY | Facility: CLINIC | Age: 65
End: 2025-04-07
Payer: COMMERCIAL

## 2025-04-07 VITALS
DIASTOLIC BLOOD PRESSURE: 68 MMHG | HEIGHT: 62 IN | SYSTOLIC BLOOD PRESSURE: 120 MMHG | HEART RATE: 66 BPM | WEIGHT: 103 LBS | OXYGEN SATURATION: 97 % | BODY MASS INDEX: 18.95 KG/M2

## 2025-04-07 DIAGNOSIS — R94.5 NONSPECIFIC ABNORMAL RESULTS OF LIVER FUNCTION STUDY: ICD-10-CM

## 2025-04-07 DIAGNOSIS — R94.31 ABNORMAL EKG: Primary | ICD-10-CM

## 2025-04-07 DIAGNOSIS — I10 PRIMARY HYPERTENSION: ICD-10-CM

## 2025-04-07 DIAGNOSIS — R91.8 LUNG MASS: ICD-10-CM

## 2025-04-07 DIAGNOSIS — I25.10 CORONARY ARTERY CALCIFICATION: ICD-10-CM

## 2025-04-07 DIAGNOSIS — F17.210 CIGARETTE SMOKER: ICD-10-CM

## 2025-04-07 DIAGNOSIS — I31.39 PERICARDIAL EFFUSION (HHS-HCC): ICD-10-CM

## 2025-04-07 DIAGNOSIS — J43.9 PULMONARY EMPHYSEMA, UNSPECIFIED EMPHYSEMA TYPE (MULTI): ICD-10-CM

## 2025-04-07 DIAGNOSIS — D38.1 NEOPLASM OF UNCERTAIN BEHAVIOR OF LUNG: ICD-10-CM

## 2025-04-07 DIAGNOSIS — Z71.89 CARDIAC RISK COUNSELING: ICD-10-CM

## 2025-04-07 LAB
ATRIAL RATE: 66 BPM
P AXIS: 7 DEGREES
P OFFSET: 207 MS
P ONSET: 160 MS
PR INTERVAL: 126 MS
Q ONSET: 223 MS
QRS COUNT: 11 BEATS
QRS DURATION: 70 MS
QT INTERVAL: 394 MS
QTC CALCULATION(BAZETT): 413 MS
QTC FREDERICIA: 407 MS
R AXIS: 79 DEGREES
T AXIS: 47 DEGREES
T OFFSET: 420 MS
VENTRICULAR RATE: 66 BPM

## 2025-04-07 PROCEDURE — 3074F SYST BP LT 130 MM HG: CPT | Performed by: INTERNAL MEDICINE

## 2025-04-07 PROCEDURE — 3078F DIAST BP <80 MM HG: CPT | Performed by: INTERNAL MEDICINE

## 2025-04-07 PROCEDURE — 99214 OFFICE O/P EST MOD 30 MIN: CPT | Performed by: INTERNAL MEDICINE

## 2025-04-07 PROCEDURE — 93005 ELECTROCARDIOGRAM TRACING: CPT | Performed by: INTERNAL MEDICINE

## 2025-04-07 PROCEDURE — 1159F MED LIST DOCD IN RCRD: CPT | Performed by: INTERNAL MEDICINE

## 2025-04-07 PROCEDURE — 99204 OFFICE O/P NEW MOD 45 MIN: CPT | Performed by: INTERNAL MEDICINE

## 2025-04-07 PROCEDURE — 3008F BODY MASS INDEX DOCD: CPT | Performed by: INTERNAL MEDICINE

## 2025-04-07 RX ORDER — ATORVASTATIN CALCIUM 20 MG/1
20 TABLET, FILM COATED ORAL DAILY
Qty: 90 TABLET | Refills: 3 | Status: SHIPPED | OUTPATIENT
Start: 2025-04-07 | End: 2026-04-07

## 2025-04-07 NOTE — PROGRESS NOTES
Referred by Dr. Urbina for Establish Care (Referral from pcp)     History Of Present Illness:    Lilly Nina is a 65 y.o. female with history of hypertension/tobacco abuse but no other cardiovascular disease presenting for cardiac evaluation.  She has a suspicious left lower lung mass  3/19/2025 chest CT revealed a small pericardial effusion along with an estimated coronary calcium score of 125  No significant SOB-does Jazzercise 3 days per week  No angina  No palpitations/dizziness/LH/edema    Cutting back on cigarettes    Past Medical History:  She has a past medical history of Encounter for screening for other disorder (01/12/2022).    Past Surgical History:  She has a past surgical history that includes Other surgical history (09/09/2021); Other surgical history (09/09/2021); Other surgical history (09/09/2021); and Other surgical history (09/09/2021).      Social History:  She reports that she has been smoking cigarettes. She has been exposed to tobacco smoke. She has never used smokeless tobacco. She reports current alcohol use of about 1.0 standard drink of alcohol per week. She reports that she does not use drugs.    Family History:  Family History   Problem Relation Name Age of Onset    Hypertension Mother      Hyperlipidemia Mother     No family CAD     Allergies:  Patient has no known allergies.    Outpatient Medications:  Current Outpatient Medications   Medication Instructions    ascorbic acid (Vitamin C) 1,000 mg tablet 1 tablet, Daily    cholecalciferol (Vitamin D-3) 50 mcg (2,000 unit) capsule Take by mouth.    cyanocobalamin (Vitamin B-12) 1,000 mcg tablet Take by mouth.    doxylamine (Unisom, doxylamine,) 25 mg tablet 1 tablet, Nightly    lisinopril 5 mg, oral, Daily    nicotine (Nicoderm CQ) 21 mg/24 hr patch 1 patch, transdermal, Every 24 hours    traZODone (DESYREL) 100 mg, oral, Nightly PRN        Last Recorded Vitals:  Vitals:    04/07/25 1358   BP: 98/58   BP Location: Left arm   Patient  "Position: Sitting   BP Cuff Size: Adult   Pulse: 66   SpO2: 97%   Weight: 46.7 kg (103 lb)   Height: 1.575 m (5' 2\")       Physical Exam:  Constitutional:       General: Awake.      Appearance: Healthy appearance. Not in distress. Obese.   Neck:      Vascular: No JVR. JVD normal.   Pulmonary:      Effort: Pulmonary effort is normal.      Breath sounds: Normal breath sounds. No wheezing. No rhonchi. No rales.   Chest:      Chest wall: Not tender to palpatation.   Cardiovascular:      PMI at left midclavicular line. Normal rate. Regular rhythm. Normal S1. Normal S2.       Murmurs: There is no murmur.      No gallop.  No click. No rub.   Pulses:     Intact distal pulses.   Edema:     Peripheral edema absent.   Abdominal:      General: Bowel sounds are normal.      Palpations: Abdomen is soft.      Tenderness: There is no abdominal tenderness.   Musculoskeletal: Normal range of motion.         General: No tenderness. Skin:     General: Skin is warm and dry.   Neurological:      General: No focal deficit present.      Mental Status: Alert and oriented to person, place and time.                Last Labs:  CBC -  No results found for: \"WBC\", \"HGB\", \"HCT\", \"MCV\", \"PLT\"    CMP -  Lab Results   Component Value Date    CALCIUM 10.0 03/21/2024       LIPID PANEL -2/2025  Total 164  HDL 63  LDL 91    RENAL FUNCTION PANEL -   Lab Results   Component Value Date    GLUCOSE 86 03/21/2024     03/21/2024    K 4.5 03/21/2024     03/21/2024    CO2 30 03/21/2024    ANIONGAP 11 03/21/2024    BUN 13 03/21/2024    CREATININE 0.82 03/21/2024    CALCIUM 10.0 03/21/2024 2/2025  Creatinine 0.83  Potassium 4.5    Lab Results   Component Value Date    HGBA1C 5.9 (H) 02/20/2025       Last Cardiology Tests:  ECG:  Encounter Date: 04/07/25   ECG 12 lead (Clinic Performed)   Result Value    Ventricular Rate 66    Atrial Rate 66    NH Interval 126    QRS Duration 70    QT Interval 394    QTC Calculation(Bazett) 413    P Axis 7    R Axis " "79    T Axis 47    QRS Count 11    Q Onset 223    P Onset 160    P Offset 207    T Offset 420    QTC Fredericia 407    Narrative    Normal sinus rhythm  Nonspecific T wave abnormality  Abnormal ECG  No previous ECGs available        Echo:  No results found for this or any previous visit from the past 1095 days.      Ejection Fractions:  No results found for: \"EF\"    Cath:  No results found for this or any previous visit from the past 1095 days.      Stress Test:  No results found for this or any previous visit from the past 1095 days.      Cardiac Imaging: 3/2025 chest CT  Small pericardial effusion  Estimated coronary calcium score of 126            Lab review: I have personally reviewed the laboratory result(s)     Assessment/Plan   Problem List Items Addressed This Visit       Cigarette smoker    Overview     Patient started wearing a patch  Need to stop smoking to reduce her cardiac and pulmonary risks         Nonspecific abnormal results of liver function study    Overview     yrs ago, after illness, resolved  NL per 2/2025 CMP         Mass of left lung    Overview     Suspected malignancy-thoracic surgery is evaluating  Bronchoscopy planned         Pericardial effusion (HHS-HCC)    Overview     Small per 3/2025 chest CT-lung mass abuts the pericardial surface therefore possible malignant effusion  No current clinical evidence of tamponade  Will check echo         Relevant Orders    ECG 12 lead (Clinic Performed) (Completed)    Abnormal EKG - Primary    Overview     Nonspecific T wave changes noted  Check echo         Primary hypertension    Overview     BP stable on lisinopril  2/2025 BMP OK         Emphysema lung (Multi)    Overview     Noted on chest CT  No wheeze  Stop smoking         Cardiac risk counseling    Overview     2/2025 LDL=91 but has CAC thus start HI statin         Coronary artery calcification    Overview     126 AG units 3/2025 CT  No angina  NS T wave changes  Start statin  Hold ASA with " possible malignant per effusion and need for bronchoscopy with lung mass          Other Visit Diagnoses       Neoplasm of uncertain behavior of lung                   Echocardiogram= pericardial effusion  Atorvastatin for cholesterol once daily with biggest meal  Artie Braga DO

## 2025-04-08 ENCOUNTER — TELEPHONE (OUTPATIENT)
Dept: PRIMARY CARE | Facility: CLINIC | Age: 65
End: 2025-04-08
Payer: COMMERCIAL

## 2025-04-08 DIAGNOSIS — M81.0 AGE RELATED OSTEOPOROSIS, UNSPECIFIED PATHOLOGICAL FRACTURE PRESENCE: Primary | ICD-10-CM

## 2025-04-08 NOTE — TELEPHONE ENCOUNTER
Osteoporosis noted on dexa scan   She is currently taking vit d and calcium   Referral placed for rheumatology

## 2025-04-11 ENCOUNTER — TELEMEDICINE (OUTPATIENT)
Dept: PULMONOLOGY | Facility: CLINIC | Age: 65
End: 2025-04-11
Payer: COMMERCIAL

## 2025-04-11 DIAGNOSIS — Z01.811 PREOP PULMONARY/RESPIRATORY EXAM: Primary | ICD-10-CM

## 2025-04-11 DIAGNOSIS — R91.8 MASS OF LOWER LOBE OF LEFT LUNG: ICD-10-CM

## 2025-04-11 PROCEDURE — 4004F PT TOBACCO SCREEN RCVD TLK: CPT | Performed by: INTERNAL MEDICINE

## 2025-04-11 PROCEDURE — 1159F MED LIST DOCD IN RCRD: CPT | Performed by: INTERNAL MEDICINE

## 2025-04-11 PROCEDURE — 99213 OFFICE O/P EST LOW 20 MIN: CPT | Performed by: INTERNAL MEDICINE

## 2025-04-11 NOTE — PROGRESS NOTES
Subjective   Patient ID: Lilly Nina is a 65 y.o. female who presents for pre bronchoscopy.  HPI  Patient was contacted at her home today by telephone.  She is scheduled for a bronchoscopy on 4/21/2025 to biopsy a left lower lobe lung mass at 10:30 AM at [facility] which is at [facility] [address]  The patient does have a prior radiology appointment at 930 at the [facility].  Patient reports that she is aware of the location of both of those facilities.  The PET scan shows a spiculated left lower lobe mass measuring about 3.7 cm.  I reviewed the patient's current medical history and she denies having a sore throat, hemoptysis or fever.  She has some occasional left-sided chest discomfort for the past couple weeks.  She denies any recent history of pneumonia or COVID exposure.  Patient is also 1 pack/day smoker from 19 74-3 [id number].  She was smoking a pack a day at that time and is now reduce that to half pack per day.  She admits only rare alcohol usage.  I answered all the patient's questions to her satisfaction.  Review of Systems  Not done  Objective   Physical Exam  Not done  Assessment/Plan        1.  Preop pulmonary/respiratory exam.  2.  Lung mass left lower lobe.    Bishnu Crocker DO 04/11/25 11:18 AM

## 2025-04-14 ENCOUNTER — APPOINTMENT (OUTPATIENT)
Dept: RADIOLOGY | Facility: HOSPITAL | Age: 65
End: 2025-04-14
Payer: COMMERCIAL

## 2025-04-16 LAB
ANION GAP SERPL CALCULATED.4IONS-SCNC: 9 MMOL/L (CALC) (ref 7–17)
ATRIAL RATE: 66 BPM
BUN SERPL-MCNC: 11 MG/DL (ref 7–25)
BUN/CREAT SERPL: NORMAL (CALC) (ref 6–22)
CALCIUM SERPL-MCNC: 9.4 MG/DL (ref 8.6–10.4)
CHLORIDE SERPL-SCNC: 101 MMOL/L (ref 98–110)
CO2 SERPL-SCNC: 27 MMOL/L (ref 20–32)
CREAT SERPL-MCNC: 0.7 MG/DL (ref 0.5–1.05)
EGFRCR SERPLBLD CKD-EPI 2021: 95 ML/MIN/1.73M2
ERYTHROCYTE [DISTWIDTH] IN BLOOD BY AUTOMATED COUNT: 12.2 % (ref 11–15)
GLUCOSE SERPL-MCNC: 86 MG/DL (ref 65–99)
HCT VFR BLD AUTO: 41.2 % (ref 35–45)
HGB BLD-MCNC: 13.7 G/DL (ref 11.7–15.5)
MCH RBC QN AUTO: 30.2 PG (ref 27–33)
MCHC RBC AUTO-ENTMCNC: 33.3 G/DL (ref 32–36)
MCV RBC AUTO: 90.9 FL (ref 80–100)
P AXIS: 7 DEGREES
P OFFSET: 207 MS
P ONSET: 160 MS
PLATELET # BLD AUTO: 362 THOUSAND/UL (ref 140–400)
PMV BLD REES-ECKER: 9.8 FL (ref 7.5–12.5)
POTASSIUM SERPL-SCNC: 4.4 MMOL/L (ref 3.5–5.3)
PR INTERVAL: 126 MS
Q ONSET: 223 MS
QRS COUNT: 11 BEATS
QRS DURATION: 70 MS
QT INTERVAL: 394 MS
QTC CALCULATION(BAZETT): 413 MS
QTC FREDERICIA: 407 MS
R AXIS: 79 DEGREES
RBC # BLD AUTO: 4.53 MILLION/UL (ref 3.8–5.1)
SODIUM SERPL-SCNC: 137 MMOL/L (ref 135–146)
T AXIS: 47 DEGREES
T OFFSET: 420 MS
VENTRICULAR RATE: 66 BPM
WBC # BLD AUTO: 10.1 THOUSAND/UL (ref 3.8–10.8)

## 2025-04-21 ENCOUNTER — HOSPITAL ENCOUNTER (OUTPATIENT)
Dept: RADIOLOGY | Facility: HOSPITAL | Age: 65
Discharge: HOME | End: 2025-04-21
Payer: COMMERCIAL

## 2025-04-21 ENCOUNTER — HOSPITAL ENCOUNTER (OUTPATIENT)
Dept: GASTROENTEROLOGY | Facility: HOSPITAL | Age: 65
Discharge: HOME | End: 2025-04-21
Payer: COMMERCIAL

## 2025-04-21 ENCOUNTER — ANESTHESIA EVENT (OUTPATIENT)
Dept: GASTROENTEROLOGY | Facility: HOSPITAL | Age: 65
End: 2025-04-21
Payer: COMMERCIAL

## 2025-04-21 ENCOUNTER — ANESTHESIA (OUTPATIENT)
Dept: GASTROENTEROLOGY | Facility: HOSPITAL | Age: 65
End: 2025-04-21
Payer: COMMERCIAL

## 2025-04-21 VITALS
RESPIRATION RATE: 23 BRPM | SYSTOLIC BLOOD PRESSURE: 105 MMHG | HEIGHT: 62 IN | DIASTOLIC BLOOD PRESSURE: 58 MMHG | HEART RATE: 71 BPM | WEIGHT: 103 LBS | BODY MASS INDEX: 18.95 KG/M2 | OXYGEN SATURATION: 98 % | TEMPERATURE: 97.1 F

## 2025-04-21 DIAGNOSIS — R91.8 LUNG MASS: ICD-10-CM

## 2025-04-21 PROCEDURE — 2720000007 HC OR 272 NO HCPCS

## 2025-04-21 PROCEDURE — 31625 BRONCHOSCOPY W/BIOPSY(S): CPT | Performed by: STUDENT IN AN ORGANIZED HEALTH CARE EDUCATION/TRAINING PROGRAM

## 2025-04-21 PROCEDURE — 31629 BRONCHOSCOPY/NEEDLE BX EACH: CPT | Performed by: STUDENT IN AN ORGANIZED HEALTH CARE EDUCATION/TRAINING PROGRAM

## 2025-04-21 PROCEDURE — 2500000004 HC RX 250 GENERAL PHARMACY W/ HCPCS (ALT 636 FOR OP/ED): Mod: JZ

## 2025-04-21 PROCEDURE — 87116 MYCOBACTERIA CULTURE: CPT | Performed by: STUDENT IN AN ORGANIZED HEALTH CARE EDUCATION/TRAINING PROGRAM

## 2025-04-21 PROCEDURE — 3700000002 HC GENERAL ANESTHESIA TIME - EACH INCREMENTAL 1 MINUTE

## 2025-04-21 PROCEDURE — 7100000010 HC PHASE TWO TIME - EACH INCREMENTAL 1 MINUTE

## 2025-04-21 PROCEDURE — 31653 BRONCH EBUS SAMPLNG 3/> NODE: CPT | Performed by: STUDENT IN AN ORGANIZED HEALTH CARE EDUCATION/TRAINING PROGRAM

## 2025-04-21 PROCEDURE — 31654 BRONCH EBUS IVNTJ PERPH LES: CPT | Performed by: STUDENT IN AN ORGANIZED HEALTH CARE EDUCATION/TRAINING PROGRAM

## 2025-04-21 PROCEDURE — 7100000009 HC PHASE TWO TIME - INITIAL BASE CHARGE

## 2025-04-21 PROCEDURE — 7100000001 HC RECOVERY ROOM TIME - INITIAL BASE CHARGE

## 2025-04-21 PROCEDURE — 87205 SMEAR GRAM STAIN: CPT | Performed by: STUDENT IN AN ORGANIZED HEALTH CARE EDUCATION/TRAINING PROGRAM

## 2025-04-21 PROCEDURE — 87102 FUNGUS ISOLATION CULTURE: CPT | Performed by: STUDENT IN AN ORGANIZED HEALTH CARE EDUCATION/TRAINING PROGRAM

## 2025-04-21 PROCEDURE — 71250 CT THORAX DX C-: CPT | Performed by: RADIOLOGY

## 2025-04-21 PROCEDURE — 71250 CT THORAX DX C-: CPT

## 2025-04-21 PROCEDURE — 31628 BRONCHOSCOPY/LUNG BX EACH: CPT | Performed by: STUDENT IN AN ORGANIZED HEALTH CARE EDUCATION/TRAINING PROGRAM

## 2025-04-21 PROCEDURE — 3700000001 HC GENERAL ANESTHESIA TIME - INITIAL BASE CHARGE

## 2025-04-21 PROCEDURE — 7100000002 HC RECOVERY ROOM TIME - EACH INCREMENTAL 1 MINUTE

## 2025-04-21 RX ORDER — PROPOFOL 10 MG/ML
INJECTION, EMULSION INTRAVENOUS AS NEEDED
Status: DISCONTINUED | OUTPATIENT
Start: 2025-04-21 | End: 2025-04-21

## 2025-04-21 RX ORDER — MIDAZOLAM HYDROCHLORIDE 1 MG/ML
INJECTION INTRAMUSCULAR; INTRAVENOUS AS NEEDED
Status: DISCONTINUED | OUTPATIENT
Start: 2025-04-21 | End: 2025-04-21

## 2025-04-21 RX ORDER — FENTANYL CITRATE 50 UG/ML
50 INJECTION, SOLUTION INTRAMUSCULAR; INTRAVENOUS EVERY 5 MIN PRN
Status: DISCONTINUED | OUTPATIENT
Start: 2025-04-21 | End: 2025-04-22 | Stop reason: HOSPADM

## 2025-04-21 RX ORDER — OXYCODONE HYDROCHLORIDE 5 MG/1
10 TABLET ORAL EVERY 4 HOURS PRN
Status: DISCONTINUED | OUTPATIENT
Start: 2025-04-21 | End: 2025-04-22 | Stop reason: HOSPADM

## 2025-04-21 RX ORDER — ONDANSETRON HYDROCHLORIDE 2 MG/ML
4 INJECTION, SOLUTION INTRAVENOUS ONCE AS NEEDED
Status: DISCONTINUED | OUTPATIENT
Start: 2025-04-21 | End: 2025-04-22 | Stop reason: HOSPADM

## 2025-04-21 RX ORDER — FENTANYL CITRATE 50 UG/ML
25 INJECTION, SOLUTION INTRAMUSCULAR; INTRAVENOUS EVERY 5 MIN PRN
Status: DISCONTINUED | OUTPATIENT
Start: 2025-04-21 | End: 2025-04-22 | Stop reason: HOSPADM

## 2025-04-21 RX ORDER — ONDANSETRON HYDROCHLORIDE 2 MG/ML
INJECTION, SOLUTION INTRAVENOUS AS NEEDED
Status: DISCONTINUED | OUTPATIENT
Start: 2025-04-21 | End: 2025-04-21

## 2025-04-21 RX ORDER — OXYCODONE HYDROCHLORIDE 5 MG/1
5 TABLET ORAL EVERY 4 HOURS PRN
Status: DISCONTINUED | OUTPATIENT
Start: 2025-04-21 | End: 2025-04-22 | Stop reason: HOSPADM

## 2025-04-21 RX ORDER — ROCURONIUM BROMIDE 10 MG/ML
INJECTION, SOLUTION INTRAVENOUS AS NEEDED
Status: DISCONTINUED | OUTPATIENT
Start: 2025-04-21 | End: 2025-04-21

## 2025-04-21 RX ORDER — GLYCOPYRROLATE 0.2 MG/ML
INJECTION INTRAMUSCULAR; INTRAVENOUS AS NEEDED
Status: DISCONTINUED | OUTPATIENT
Start: 2025-04-21 | End: 2025-04-21

## 2025-04-21 RX ORDER — LIDOCAINE HYDROCHLORIDE 20 MG/ML
INJECTION, SOLUTION INFILTRATION; PERINEURAL AS NEEDED
Status: DISCONTINUED | OUTPATIENT
Start: 2025-04-21 | End: 2025-04-21

## 2025-04-21 RX ORDER — METOCLOPRAMIDE HYDROCHLORIDE 5 MG/ML
10 INJECTION INTRAMUSCULAR; INTRAVENOUS ONCE AS NEEDED
Status: DISCONTINUED | OUTPATIENT
Start: 2025-04-21 | End: 2025-04-22 | Stop reason: HOSPADM

## 2025-04-21 RX ORDER — LIDOCAINE IN NACL,ISO-OSMOT/PF 30 MG/3 ML
0.1 SYRINGE (ML) INJECTION ONCE
Status: DISCONTINUED | OUTPATIENT
Start: 2025-04-21 | End: 2025-04-22 | Stop reason: HOSPADM

## 2025-04-21 RX ORDER — SODIUM CHLORIDE, SODIUM LACTATE, POTASSIUM CHLORIDE, CALCIUM CHLORIDE 600; 310; 30; 20 MG/100ML; MG/100ML; MG/100ML; MG/100ML
100 INJECTION, SOLUTION INTRAVENOUS CONTINUOUS
Status: SHIPPED | OUTPATIENT
Start: 2025-04-21 | End: 2025-04-21

## 2025-04-21 RX ADMIN — GLYCOPYRROLATE 0.1 MG: 0.2 INJECTION INTRAMUSCULAR; INTRAVENOUS at 12:10

## 2025-04-21 RX ADMIN — MIDAZOLAM HYDROCHLORIDE 1 MG: 2 INJECTION, SOLUTION INTRAMUSCULAR; INTRAVENOUS at 11:29

## 2025-04-21 RX ADMIN — PROPOFOL 125 MCG/KG/MIN: 10 INJECTION, EMULSION INTRAVENOUS at 11:31

## 2025-04-21 RX ADMIN — SODIUM CHLORIDE, SODIUM LACTATE, POTASSIUM CHLORIDE, AND CALCIUM CHLORIDE: 600; 310; 30; 20 INJECTION, SOLUTION INTRAVENOUS at 11:22

## 2025-04-21 RX ADMIN — ONDANSETRON 4 MG: 2 INJECTION, SOLUTION INTRAMUSCULAR; INTRAVENOUS at 12:29

## 2025-04-21 RX ADMIN — ROCURONIUM 50 MG: 50 INJECTION, SOLUTION INTRAVENOUS at 11:30

## 2025-04-21 RX ADMIN — PROPOFOL 30 MG: 10 INJECTION, EMULSION INTRAVENOUS at 11:34

## 2025-04-21 RX ADMIN — SUGAMMADEX 200 MG: 100 INJECTION, SOLUTION INTRAVENOUS at 12:35

## 2025-04-21 RX ADMIN — GLYCOPYRROLATE 0.1 MG: 0.2 INJECTION INTRAMUSCULAR; INTRAVENOUS at 11:45

## 2025-04-21 RX ADMIN — PROPOFOL 40 MG: 10 INJECTION, EMULSION INTRAVENOUS at 12:33

## 2025-04-21 RX ADMIN — DEXAMETHASONE SODIUM PHOSPHATE 4 MG: 4 INJECTION INTRA-ARTICULAR; INTRALESIONAL; INTRAMUSCULAR; INTRAVENOUS; SOFT TISSUE at 11:38

## 2025-04-21 RX ADMIN — LIDOCAINE HYDROCHLORIDE 60 MG: 20 INJECTION, SOLUTION INFILTRATION; PERINEURAL at 11:30

## 2025-04-21 RX ADMIN — PROPOFOL 130 MG: 10 INJECTION, EMULSION INTRAVENOUS at 11:30

## 2025-04-21 SDOH — HEALTH STABILITY: MENTAL HEALTH: CURRENT SMOKER: 1

## 2025-04-21 ASSESSMENT — PAIN SCALES - GENERAL
PAINLEVEL_OUTOF10: 0 - NO PAIN

## 2025-04-21 ASSESSMENT — PAIN - FUNCTIONAL ASSESSMENT
PAIN_FUNCTIONAL_ASSESSMENT: 0-10
PAIN_FUNCTIONAL_ASSESSMENT: UNABLE TO SELF-REPORT
PAIN_FUNCTIONAL_ASSESSMENT: 0-10

## 2025-04-21 NOTE — ANESTHESIA PROCEDURE NOTES
Airway  Date/Time: 4/21/2025 11:34 AM  Reason: elective    Airway not difficult    Staffing  Performed: resident   Authorized by: Gracie Elena MD    Performed by: VINCENT Pickering  Patient location during procedure: OR    Patient Condition  Indications for airway management: anesthesia  Planned trial extubation    Final Airway Details   Preoxygenated: yes  Final airway type: endotracheal airway  Successful airway: ETT  Cuffed: yes   Successful intubation technique: direct laryngoscopy  Adjuncts used in placement: intubating stylet  Endotracheal tube insertion site: oral  Blade: Alfredo  Blade size: #3  ETT size (mm): 8.5  Cormack-Lehane Classification: grade I - full view of glottis  Placement verified by: chest auscultation and capnometry   Cuff volume (mL): 8  Measured from: lips  ETT to lips (cm): 21  Number of attempts at approach: 1

## 2025-04-21 NOTE — LETTER
DearLilly       4/9/2025                                                                                                INFORMATION FOR YOUR PROCEDURE    INSTRUCTIONS MUST BE FOLLOWED OR YOU RUN THE RISK OF YOUR CASE BEING CANCELED    Information is attached to this e-mail for your upcoming procedure. (Look for the paperclip in your email to access this information)  Lab requisitions (if needed), are also included as well as procedural instructions.       You are scheduled for your Bronchoscopy on 4/21/25  , with Dr. Charan Melgar [facility] [address] [place] [address]    09:30 AM    - CT  Scan  [facility]   2nd Floor Radiology  [address]    When finished with the CT scan,   please make your way to [facility] Room [address].  This is right around the corner from [facility].  Located on the first floor.  There are information desks there for your convenience and if you have questions.    Check In will be at  10:30  AM.  This allows us to prepare you for the actual procedure.                                        Bronchoscopy   Location:  [address] [facility]                                         Bronchoscopy / Endoscopy Suite    NPO (No food or drink) after midnight the night before your procedure. This includes coffee, water, and soda, hard candy, gum or mints.  If taking your morning medications, a small sip of water is allowed to get the medication down.    For your safety, you must have a responsible, adult  accompany you to your procedure.  You will not be permitted to drive yourself home if you have received any type of anesthesia or sedation.    Automated calls about your upcoming scheduled appointments can be quite confusing for patients.    The times may vary depending on what you have scheduled for procedure day. Follow the time/s I have given you on your information sheet so there is no confusion.  Be aware you may receive  conflicting times from different departments.      Blood work will need to be done prior to your procedure, preferably at a  Facility.  There are no restrictions for testing. I have attached a requisition for you.    Dr. Bishnu Crocker, will call you on 4/11/25, @ 11:20 AM    This is a phone visit to go over your medical history prior to your procedure, and is a necessary part of your medical workup.  The patient must be present at this visit.    Please reach out to me with any questions you may have  Gisele  [phone] or Akash @ [phone]    If you have an emergency (weather or other) on the day of your scheduled procedure and need to cancel for any reason, Please call the Endoscopy Suite @ [phone],  Otherwise, call Gisele @ [phone]      Have a nice day!    Gisele Huffman    Bronchoscopy   Interventional Pulmonology    MD Leonid Mario MD Sameer Avasarala, MD Catalina Teba, MD Andrew Dunatchik, MD Sruti Brahmandam, MD      [facility]  Pulmonary, Critical Care and Sleep Medicine  [address] [place] [address]  P -[phone]  - [phone]  [email or web address]

## 2025-04-21 NOTE — ANESTHESIA POSTPROCEDURE EVALUATION
Patient: iLlly Nina    Procedure Summary       Date: 04/21/25 Room / Location: [facility]    Anesthesia Start: 1122 Anesthesia Stop: 1250    Procedure: BRONCHOSCOPY Diagnosis: Lung mass    Scheduled Providers: Charan Melgar MD Responsible Provider: Neto Ramirez DO    Anesthesia Type: general ASA Status: 3            Anesthesia Type: general    Vitals Value Taken Time   /66 04/21/25 12:41   Temp 36.2 °C (97.1 °F) 04/21/25 12:41   Pulse 79 04/21/25 12:41   Resp 27 04/21/25 12:41   SpO2 100 % 04/21/25 12:41       Anesthesia Post Evaluation    Patient location during evaluation: PACU  Patient participation: complete - patient participated  Level of consciousness: awake  Pain management: adequate  Airway patency: patent  Cardiovascular status: acceptable  Respiratory status: acceptable  Hydration status: acceptable  Postoperative Nausea and Vomiting: none        There were no known notable events for this encounter.

## 2025-04-21 NOTE — ANESTHESIA PREPROCEDURE EVALUATION
Patient: Lilly Nina    Procedure Information       Date/Time: 04/21/25 1130    Scheduled providers: Charan Melgar MD    Procedure: BRONCHOSCOPY    Location: [facility]            Relevant Problems   Cardiac   (+) Abnormal EKG   (+) Essential hypertension with goal blood pressure less than 130/85   (+) Hyperlipidemia   (+) Primary hypertension      Pulmonary   (+) Emphysema lung (Multi)   (+) Mild intermittent asthma without complication (HHS-HCC)      Musculoskeletal   (+) Osteoarthritis of hip       Clinical information reviewed:   Tobacco  Allergies  Meds   Med Hx  Surg Hx   Fam Hx  Soc Hx        NPO Detail:  NPO/Void Status  Date of Last Liquid: 04/20/25  Time of Last Liquid: 1600  Date of Last Solid: 04/21/25  Time of Last Solid: 1600  Last Intake Type: Light meal  Time of Last Void: 0900         Physical Exam    Airway  Mallampati: III  TM distance: >3 FB     Cardiovascular   Rhythm: regular  Rate: normal     Dental - normal exam     Pulmonary Breath sounds clear to auscultation     Abdominal            Anesthesia Plan    History of general anesthesia?: yes  History of complications of general anesthesia?: no    ASA 3     general     The patient is a current smoker.  Patient smoked on day of procedure.    intravenous induction   Trial extubation is planned.  Anesthetic plan and risks discussed with patient.    Plan discussed with CRNA.

## 2025-04-21 NOTE — DISCHARGE INSTRUCTIONS
The anesthetics, sedatives or narcotics which were given to you today will be acting in your body for the next 24 hours, so you might feel a little sleepy or groggy. This feeling should slowly wear off.   Carefully read and follow the instructions below:   You received sedation today.   Do not drive or operate machinery or power tools of any kind.   No alcoholic beverages today, not even beer or wine.   No over the counter medications that contain alcohol or may cause drowsiness.   Do not make important decisions or sign legal documents.     Do not use Aspirin containing products or non-steroidal medications for the next 24 hours.  (Examples of these types of medications include: Advil, Aleve, Ecotrin, Ibuprofen, Motrin or Naprosyn.  This list is not all-inclusive.  Check with your physician or pharmacist before resuming these medications.)  Tylenol, cough medicine, cough drops or throat lozenges may be used when you are allowed to resume eating and drinking.     Call your physician if any of these symptoms occur:   High fever over 101 degrees or chills (a low grade fever is common for 24 hours)   Rash or hives   Persistent nausea or vomiting   Inability to urinate within 8 hours after the procedure  Go directly to the emergency room if you notice any of the following:   Shortness of breath   Chest pain  Coughing up large amounts of bright red blood greater than a teaspoonful of blood clots (about a teaspoonful for the next 24-48 hours is normal, especially if you had a biopsy)  Resume all normal medications unless directed otherwise by your doctor.     Your doctor recommends these additional instructions:    Follow up with your referring physician as previously scheduled.    If you experience any problems or have any questions following discharge, please call:   Before 5 pm: [phone]   After 5pm and on weekends: [phone] / [phone] and ask for the Pulmonary Fellow on-call (Pager Number: [phone])

## 2025-04-23 ENCOUNTER — HOSPITAL ENCOUNTER (OUTPATIENT)
Dept: CARDIOLOGY | Facility: HOSPITAL | Age: 65
Discharge: HOME | End: 2025-04-23
Payer: COMMERCIAL

## 2025-04-23 DIAGNOSIS — I31.39 PERICARDIAL EFFUSION (HHS-HCC): ICD-10-CM

## 2025-04-23 LAB
ACID FAST STN SPEC: NORMAL
AORTIC VALVE MEAN GRADIENT: 4 MMHG
AORTIC VALVE PEAK VELOCITY: 1.38 M/S
AV PEAK GRADIENT: 8 MMHG
AVA (PEAK VEL): 2.17 CM2
AVA (VTI): 1.82 CM2
BACTERIA SPEC RESP CULT: NORMAL
EJECTION FRACTION APICAL 4 CHAMBER: 67.4
EJECTION FRACTION: 72 %
GRAM STN SPEC: NORMAL
GRAM STN SPEC: NORMAL
LEFT ATRIUM VOLUME AREA LENGTH INDEX BSA: 20.2 ML/M2
LEFT VENTRICLE INTERNAL DIMENSION DIASTOLE: 4.06 CM (ref 3.5–6)
LEFT VENTRICULAR OUTFLOW TRACT DIAMETER: 1.83 CM
MITRAL VALVE E/A RATIO: 1.1
MYCOBACTERIUM SPEC CULT: NORMAL
RIGHT VENTRICLE FREE WALL PEAK S': 15 CM/S
RIGHT VENTRICLE PEAK SYSTOLIC PRESSURE: 29.3 MMHG
TRICUSPID ANNULAR PLANE SYSTOLIC EXCURSION: 2.3 CM

## 2025-04-23 PROCEDURE — 93306 TTE W/DOPPLER COMPLETE: CPT

## 2025-04-23 PROCEDURE — 93306 TTE W/DOPPLER COMPLETE: CPT | Performed by: INTERNAL MEDICINE

## 2025-04-24 ENCOUNTER — TELEPHONE (OUTPATIENT)
Dept: RADIOLOGY | Facility: HOSPITAL | Age: 65
End: 2025-04-24
Payer: COMMERCIAL

## 2025-04-24 NOTE — TELEPHONE ENCOUNTER
Call placed to the patient to update her and let her know the next steps regarding the bronchoscopy results. Patient aware that it could take some time to obtain the results of the bronchoscopy.

## 2025-04-25 DIAGNOSIS — C34.90 MALIGNANT NEOPLASM OF LUNG, UNSPECIFIED LATERALITY, UNSPECIFIED PART OF LUNG (MULTI): ICD-10-CM

## 2025-04-25 DIAGNOSIS — R91.8 MASS OF LEFT LUNG: ICD-10-CM

## 2025-04-25 LAB
FUNGUS SPEC CULT: NORMAL
FUNGUS SPEC FUNGUS STN: NORMAL
LAB AP ASR DISCLAIMER: NORMAL
LAB AP ASR DISCLAIMER: NORMAL
LABORATORY COMMENT REPORT: NORMAL
PATH REPORT.COMMENTS IMP SPEC: NORMAL
PATH REPORT.FINAL DX SPEC: NORMAL
PATH REPORT.FINAL DX SPEC: NORMAL
PATH REPORT.GROSS SPEC: NORMAL
PATH REPORT.GROSS SPEC: NORMAL
PATH REPORT.INTRAOP OBS SPEC DOC: NORMAL
PATH REPORT.TOTAL CANCER: NORMAL
PATH REPORT.TOTAL CANCER: NORMAL
RESIDENT REVIEW: NORMAL

## 2025-04-28 ENCOUNTER — TELEPHONE (OUTPATIENT)
Dept: RADIOLOGY | Facility: HOSPITAL | Age: 65
End: 2025-04-28
Payer: COMMERCIAL

## 2025-04-28 ENCOUNTER — HOSPITAL ENCOUNTER (OUTPATIENT)
Dept: RESPIRATORY THERAPY | Facility: HOSPITAL | Age: 65
Discharge: HOME | End: 2025-04-28
Payer: COMMERCIAL

## 2025-04-28 ENCOUNTER — HOSPITAL ENCOUNTER (OUTPATIENT)
Dept: RADIOLOGY | Facility: CLINIC | Age: 65
Discharge: HOME | End: 2025-04-28
Payer: COMMERCIAL

## 2025-04-28 DIAGNOSIS — R91.8 LUNG MASS: ICD-10-CM

## 2025-04-28 DIAGNOSIS — C34.90 MALIGNANT NEOPLASM OF LUNG, UNSPECIFIED LATERALITY, UNSPECIFIED PART OF LUNG (MULTI): ICD-10-CM

## 2025-04-28 DIAGNOSIS — R91.8 MASS OF LEFT LUNG: ICD-10-CM

## 2025-04-28 LAB
FUNGUS SPEC CULT: NORMAL
FUNGUS SPEC FUNGUS STN: NORMAL
LAB AP ASR DISCLAIMER: NORMAL
LABORATORY COMMENT REPORT: NORMAL
PATH REPORT.ADDENDUM SPEC: NORMAL
PATH REPORT.COMMENTS IMP SPEC: NORMAL
PATH REPORT.FINAL DX SPEC: NORMAL
PATH REPORT.GROSS SPEC: NORMAL
PATH REPORT.TOTAL CANCER: NORMAL

## 2025-04-28 PROCEDURE — 70553 MRI BRAIN STEM W/O & W/DYE: CPT | Performed by: RADIOLOGY

## 2025-04-28 PROCEDURE — 94726 PLETHYSMOGRAPHY LUNG VOLUMES: CPT | Performed by: INTERNAL MEDICINE

## 2025-04-28 PROCEDURE — 94726 PLETHYSMOGRAPHY LUNG VOLUMES: CPT

## 2025-04-28 PROCEDURE — 2550000001 HC RX 255 CONTRASTS: Performed by: STUDENT IN AN ORGANIZED HEALTH CARE EDUCATION/TRAINING PROGRAM

## 2025-04-28 PROCEDURE — 94060 EVALUATION OF WHEEZING: CPT | Performed by: INTERNAL MEDICINE

## 2025-04-28 PROCEDURE — A9575 INJ GADOTERATE MEGLUMI 0.1ML: HCPCS | Performed by: STUDENT IN AN ORGANIZED HEALTH CARE EDUCATION/TRAINING PROGRAM

## 2025-04-28 PROCEDURE — 70553 MRI BRAIN STEM W/O & W/DYE: CPT

## 2025-04-28 PROCEDURE — 94729 DIFFUSING CAPACITY: CPT | Performed by: INTERNAL MEDICINE

## 2025-04-28 RX ORDER — GADOTERATE MEGLUMINE 376.9 MG/ML
8 INJECTION INTRAVENOUS
Status: COMPLETED | OUTPATIENT
Start: 2025-04-28 | End: 2025-04-28

## 2025-04-28 RX ADMIN — GADOTERATE MEGLUMINE 8 ML: 376.9 INJECTION INTRAVENOUS at 12:10

## 2025-04-28 ASSESSMENT — ENCOUNTER SYMPTOMS
OCCASIONAL FEELINGS OF UNSTEADINESS: 0
LOSS OF SENSATION IN FEET: 0
DEPRESSION: 0

## 2025-04-28 NOTE — TELEPHONE ENCOUNTER
Call from the patient and per the patient she was wondering how to get the [facility] paperwork for her primary support person to Dr. Morrow. Provided with the phone number to Dr. Morrow office to call.  Patient scheduled for Brain MRI today at 4/28/25. Call to orthopedic office in effort to obtain letter stating that the hardware used during the hip replacement surgery is MRI compatible. Letter to be faxed to my right fax.

## 2025-04-29 ENCOUNTER — TELEPHONE (OUTPATIENT)
Dept: CARDIOTHORACIC SURGERY | Facility: HOSPITAL | Age: 65
End: 2025-04-29
Payer: COMMERCIAL

## 2025-04-29 DIAGNOSIS — C34.92 NSCLC OF LEFT LUNG (MULTI): Primary | ICD-10-CM

## 2025-04-29 DIAGNOSIS — C34.92 NSCLC OF LEFT LUNG (MULTI): ICD-10-CM

## 2025-04-29 NOTE — TELEPHONE ENCOUNTER
Called patient today to discuss the workup so far and the next step of treatment.    I reviewed the EBUS report from Dr Melgar from 4/21/25. The lung nodule at LLL was biopsied. Full mediastinal lymph node evaluation was performed, in which station 7, 4L, and 11L lymph nodes were biopsied.   I reviewed the pathology report from 4/21/25. Lung nodule biopsy of LLL showed malignant cell derived from NSCLC favoring squamous cell carcinoma. Station 7, 4L and 11L lymph node sampling showed no malignant cells. Lymphoid sample present.  I reviewed the CT scan from 3/19/25 and the PET/CT from 3/26/25. It showed LLL centrally located spiculated lung mass about 37mm in size. This mass is strongly FDG avid with mSUV of 13.  --no mediastinal lymphadenopathy noted  --no distant or mediastinal FDG avidity noted  I reviewed her erin MRI from 4/27/25. It showed no intracranial metastatic disease.   I reviewed the PFT from 4/27/25. It showed FEV1 74% predicted and DLCO  79% predicted   I reviewed the TTE from 4/23/25. It showed EF 72%. RV function is normal. RVSP is 29 mmHg. No valvular disease.     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  I recommend minimally invasive left lower lobectomy for curative intent.  The risk of surgery include bleeding, infection, DVT, PE arrhythmia, air leak, and postop pain.  The alternative is SBRT.  Will depend on final pathological staging to determine if she needs additional therapy.  Patient consent to proceed with surgery.  Surgery is booked at the May 13th at [facility] [place].    Florencio Morrow MD  Thoracic Surgeon  [facility]   of Medicine  [facility]  Office phone: [phone]  Fax: [phone]

## 2025-04-30 LAB
ACID FAST STN SPEC: NORMAL
ELECTRONICALLY SIGNED BY: NORMAL
FOCUSED SOLID TUMOR DNA/RNA RESULTS: NORMAL
MYCOBACTERIUM SPEC CULT: NORMAL

## 2025-04-30 PROCEDURE — G0452 MOLECULAR PATHOLOGY INTERPR: HCPCS | Performed by: STUDENT IN AN ORGANIZED HEALTH CARE EDUCATION/TRAINING PROGRAM

## 2025-04-30 PROCEDURE — 81458 SO GSAP DNA CPY NMBR&MCRSTL: CPT | Performed by: STUDENT IN AN ORGANIZED HEALTH CARE EDUCATION/TRAINING PROGRAM

## 2025-05-02 PROBLEM — Z96.649 S/P HIP REPLACEMENT: Status: ACTIVE | Noted: 2017-03-14

## 2025-05-02 LAB
LABORATORY COMMENT REPORT: NORMAL
PATH REPORT.FINAL DX SPEC: NORMAL
PATH REPORT.GROSS SPEC: NORMAL
PATH REPORT.TOTAL CANCER: NORMAL

## 2025-05-06 ENCOUNTER — APPOINTMENT (OUTPATIENT)
Dept: LAB | Facility: HOSPITAL | Age: 65
End: 2025-05-06
Payer: COMMERCIAL

## 2025-05-06 LAB
ANION GAP SERPL CALCULATED.4IONS-SCNC: 8 MMOL/L (CALC) (ref 7–17)
BUN SERPL-MCNC: 11 MG/DL (ref 7–25)
BUN/CREAT SERPL: NORMAL (CALC) (ref 6–22)
CALCIUM SERPL-MCNC: 9.2 MG/DL (ref 8.6–10.4)
CHLORIDE SERPL-SCNC: 102 MMOL/L (ref 98–110)
CO2 SERPL-SCNC: 27 MMOL/L (ref 20–32)
CREAT SERPL-MCNC: 0.63 MG/DL (ref 0.5–1.05)
EGFRCR SERPLBLD CKD-EPI 2021: 97 ML/MIN/1.73M2
ERYTHROCYTE [DISTWIDTH] IN BLOOD BY AUTOMATED COUNT: 12.2 % (ref 11–15)
GLUCOSE SERPL-MCNC: 70 MG/DL (ref 65–99)
HCT VFR BLD AUTO: 39.2 % (ref 35–45)
HGB BLD-MCNC: 13 G/DL (ref 11.7–15.5)
INR PPP: 0.9
MCH RBC QN AUTO: 30 PG (ref 27–33)
MCHC RBC AUTO-ENTMCNC: 33.2 G/DL (ref 32–36)
MCV RBC AUTO: 90.5 FL (ref 80–100)
PLATELET # BLD AUTO: 328 THOUSAND/UL (ref 140–400)
PMV BLD REES-ECKER: 9.8 FL (ref 7.5–12.5)
POTASSIUM SERPL-SCNC: 4.4 MMOL/L (ref 3.5–5.3)
PROTHROMBIN TIME: 10.1 SEC (ref 9–11.5)
RBC # BLD AUTO: 4.33 MILLION/UL (ref 3.8–5.1)
SODIUM SERPL-SCNC: 137 MMOL/L (ref 135–146)
WBC # BLD AUTO: 8.9 THOUSAND/UL (ref 3.8–10.8)

## 2025-05-06 PROCEDURE — 86901 BLOOD TYPING SEROLOGIC RH(D): CPT

## 2025-05-06 PROCEDURE — 86900 BLOOD TYPING SEROLOGIC ABO: CPT

## 2025-05-06 PROCEDURE — 86850 RBC ANTIBODY SCREEN: CPT

## 2025-05-07 ENCOUNTER — APPOINTMENT (OUTPATIENT)
Dept: RESPIRATORY THERAPY | Facility: HOSPITAL | Age: 65
End: 2025-05-07
Payer: COMMERCIAL

## 2025-05-07 LAB
ABO GROUP (TYPE) IN BLOOD: NORMAL
ACID FAST STN SPEC: NORMAL
ANTIBODY SCREEN: NORMAL
MYCOBACTERIUM SPEC CULT: NORMAL
RH FACTOR (ANTIGEN D): NORMAL

## 2025-05-11 LAB
FUNGUS SPEC CULT: NORMAL
FUNGUS SPEC FUNGUS STN: NORMAL

## 2025-05-12 ENCOUNTER — ANESTHESIA EVENT (OUTPATIENT)
Dept: OPERATING ROOM | Facility: HOSPITAL | Age: 65
End: 2025-05-12
Payer: COMMERCIAL

## 2025-05-12 NOTE — ANESTHESIA PREPROCEDURE EVALUATION
Patient: Lilly Nina    Procedure Information       Date/Time: 05/13/25 0645    Procedure: Robotic assisted LLL lobectomy, MLND (Left: Chest)    Location: [facility] OR 14 / [facility]    Surgeons: Florencio Morrow MD            Relevant Problems   Anesthesia (within normal limits)      Cardiac   (+) Abnormal EKG   (+) Hyperlipidemia      Pulmonary   (+) Emphysema lung (Multi)   (+) Mild intermittent asthma without complication (HHS-HCC)   (+) NSCLC of left lung (Multi)      Neuro (within normal limits)      GI (within normal limits)      /Renal (within normal limits)      Liver (within normal limits)      Endocrine (within normal limits)      Hematology (within normal limits)      Musculoskeletal   (+) Arthralgia of hip   (+) Osteoarthritis of hip      Circulatory   (+) Coronary artery calcification      Tobacco   (+) Cigarette smoker      Musculoskeletal and Injuries   (+) Chronic back pain       Clinical information reviewed:     Meds               NPO Detail:  No data recorded      Chemistry    Lab Results   Component Value Date/Time     05/06/2025 1357    K 4.4 05/06/2025 1357     05/06/2025 1357    CO2 27 05/06/2025 1357    BUN 11 05/06/2025 1357    CREATININE 0.63 05/06/2025 1357    Lab Results   Component Value Date/Time    CALCIUM 9.2 05/06/2025 1357          Lab Results   Component Value Date/Time    WBC 8.9 05/06/2025 1357    HGB 13.0 05/06/2025 1357    HCT 39.2 05/06/2025 1357     05/06/2025 1357     Lab Results   Component Value Date/Time    PROTIME 10.1 05/06/2025 1353    INR 0.9 05/06/2025 1353     Encounter Date: 04/07/25   ECG 12 lead (Clinic Performed)   Result Value    Ventricular Rate 66    Atrial Rate 66    VT Interval 126    QRS Duration 70    QT Interval 394    QTC Calculation(Bazett) 413    P Axis 7    R Axis 79    T Axis 47    QRS Count 11    Q Onset 223    P Onset 160    P Offset 207    T Offset 420    QTC Fredericia 407    Narrative    Normal sinus  rhythm  Nonspecific T wave abnormality  Borderline ECG  Confirmed by Artie Braga ([id number]) on 4/16/2025 3:16:18 PM       TTE Echo (4/23/2025)    PHYSICIAN INTERPRETATION:  Left Ventricle: Left ventricular ejection fraction is normal, calculated by Ayon's biplane at 72%. There are no regional left ventricular wall motion abnormalities. The left ventricular cavity size is normal. There is normal septal and normal posterior left ventricular wall thickness. Spectral Doppler shows a normal pattern of left ventricular diastolic filling.  Left Atrium: The left atrial size is normal.  Right Ventricle: The right ventricle is normal in size. There is normal right ventricular global systolic function.  Right Atrium: The right atrium is normal in size.  Aortic Valve: The aortic valve is trileaflet. The aortic valve dimensionless index is 0.69. There is no evidence of aortic valve regurgitation. The peak instantaneous gradient of the aortic valve is 8 mmHg. The mean gradient of the aortic valve is 4 mmHg.  Mitral Valve: The mitral valve is normal in structure. There is no evidence of mitral valve regurgitation.  Tricuspid Valve: The tricuspid valve is structurally normal. No evidence of tricuspid regurgitation.  Pulmonic Valve: The pulmonic valve is structurally normal. There is no indication of pulmonic valve regurgitation.  Pericardium: There is no pericardial effusion noted.  Aorta: The aortic root is normal.  In comparison to the previous echocardiogram(s):        CONCLUSIONS:   1. Left ventricular ejection fraction is normal, calculated by Ayon's biplane at 72%.   2. There is no pericardial effusion noted.       Physical Exam    Airway  Mallampati: II  TM distance: >3 FB     Cardiovascular   Rhythm: regular  Rate: normal     Dental - normal exam     Pulmonary    Abdominal (-) obese       Other findings: Grade 1 view on DL with MAC 3 per last intubation note 4/21/2025        Anesthesia Plan    History of general  anesthesia?: yes  History of complications of general anesthesia?: no    ASA 3     general     The patient is a current smoker.  Patient smoked on day of procedure.    intravenous induction   Postoperative pain plan includes opioids.  Anesthetic plan and risks discussed with patient.  Use of blood products discussed with patient who consented to blood products.    Plan discussed with resident and attending.

## 2025-05-12 NOTE — PROGRESS NOTES
Pharmacy Medication History Review    Lillydario Nina is a 65 y.o. female who is planned to be admitted for NSCLC of left lung (Multi). Pharmacy called the patient prior to their scheduled procedure and reviewed the patient's [id number] medications for accuracy.    Medications ADDED:  none  Medications CHANGED:  none  Medications REMOVED:   none    Please review updated prior to admission medication list and comments regarding how patient may be taking medications differently by going to Admission tab --> Admission Orders --> Admit Orders / Review prior to admission medications.     Preferred pharmacy, last doses of medications, and allergies to be confirmed with patient by nursing the day of procedure.     Sources used to complete the med history include:  [employer]  Pharmacy dispense history  Patient Interview Good historian  Chart Review  Care Everywhere     Below are additional concerns with the patient's PTA list.  none    Alondra Hatch [facility]  Please reach out via Secure Chat for questions

## 2025-05-13 ENCOUNTER — HOSPITAL ENCOUNTER (INPATIENT)
Facility: HOSPITAL | Age: 65
LOS: 2 days | Discharge: HOME | DRG: 164 | End: 2025-05-15
Attending: STUDENT IN AN ORGANIZED HEALTH CARE EDUCATION/TRAINING PROGRAM | Admitting: STUDENT IN AN ORGANIZED HEALTH CARE EDUCATION/TRAINING PROGRAM
Payer: COMMERCIAL

## 2025-05-13 ENCOUNTER — ANESTHESIA (OUTPATIENT)
Dept: OPERATING ROOM | Facility: HOSPITAL | Age: 65
End: 2025-05-13
Payer: COMMERCIAL

## 2025-05-13 ENCOUNTER — APPOINTMENT (OUTPATIENT)
Dept: PULMONOLOGY | Facility: CLINIC | Age: 65
End: 2025-05-13
Payer: COMMERCIAL

## 2025-05-13 ENCOUNTER — APPOINTMENT (OUTPATIENT)
Dept: RADIOLOGY | Facility: HOSPITAL | Age: 65
DRG: 164 | End: 2025-05-13
Payer: COMMERCIAL

## 2025-05-13 DIAGNOSIS — C34.92 NSCLC OF LEFT LUNG (MULTI): Primary | ICD-10-CM

## 2025-05-13 LAB
ABO GROUP (TYPE) IN BLOOD: NORMAL
ANTIBODY SCREEN: NORMAL
RH FACTOR (ANTIGEN D): NORMAL

## 2025-05-13 PROCEDURE — 88309 TISSUE EXAM BY PATHOLOGIST: CPT | Performed by: PATHOLOGY

## 2025-05-13 PROCEDURE — 2500000005 HC RX 250 GENERAL PHARMACY W/O HCPCS

## 2025-05-13 PROCEDURE — 3700000002 HC GENERAL ANESTHESIA TIME - EACH INCREMENTAL 1 MINUTE: Performed by: STUDENT IN AN ORGANIZED HEALTH CARE EDUCATION/TRAINING PROGRAM

## 2025-05-13 PROCEDURE — 2500000004 HC RX 250 GENERAL PHARMACY W/ HCPCS (ALT 636 FOR OP/ED): Mod: JZ

## 2025-05-13 PROCEDURE — 88331 PATH CONSLTJ SURG 1 BLK 1SPC: CPT | Mod: TC,SUR | Performed by: STUDENT IN AN ORGANIZED HEALTH CARE EDUCATION/TRAINING PROGRAM

## 2025-05-13 PROCEDURE — 8E0W4CZ ROBOTIC ASSISTED PROCEDURE OF TRUNK REGION, PERCUTANEOUS ENDOSCOPIC APPROACH: ICD-10-PCS | Performed by: STUDENT IN AN ORGANIZED HEALTH CARE EDUCATION/TRAINING PROGRAM

## 2025-05-13 PROCEDURE — 0BJ08ZZ INSPECTION OF TRACHEOBRONCHIAL TREE, VIA NATURAL OR ARTIFICIAL OPENING ENDOSCOPIC: ICD-10-PCS | Performed by: STUDENT IN AN ORGANIZED HEALTH CARE EDUCATION/TRAINING PROGRAM

## 2025-05-13 PROCEDURE — 0753T DGTZ GLS MCRSCP SLD LEVEL IV: CPT | Mod: TC,SUR | Performed by: STUDENT IN AN ORGANIZED HEALTH CARE EDUCATION/TRAINING PROGRAM

## 2025-05-13 PROCEDURE — A32663 PR THORACOSCOPY SURG LOBECTOMY: Performed by: STUDENT IN AN ORGANIZED HEALTH CARE EDUCATION/TRAINING PROGRAM

## 2025-05-13 PROCEDURE — 32663 THORACOSCOPY W/LOBECTOMY: CPT | Performed by: STUDENT IN AN ORGANIZED HEALTH CARE EDUCATION/TRAINING PROGRAM

## 2025-05-13 PROCEDURE — 3600000018 HC OR TIME - INITIAL BASE CHARGE - PROCEDURE LEVEL SIX: Performed by: STUDENT IN AN ORGANIZED HEALTH CARE EDUCATION/TRAINING PROGRAM

## 2025-05-13 PROCEDURE — 71045 X-RAY EXAM CHEST 1 VIEW: CPT | Performed by: RADIOLOGY

## 2025-05-13 PROCEDURE — 07TD4ZZ RESECTION OF AORTIC LYMPHATIC, PERCUTANEOUS ENDOSCOPIC APPROACH: ICD-10-PCS | Performed by: STUDENT IN AN ORGANIZED HEALTH CARE EDUCATION/TRAINING PROGRAM

## 2025-05-13 PROCEDURE — 88341 IMHCHEM/IMCYTCHM EA ADD ANTB: CPT | Performed by: PATHOLOGY

## 2025-05-13 PROCEDURE — 2500000005 HC RX 250 GENERAL PHARMACY W/O HCPCS: Performed by: PHYSICIAN ASSISTANT

## 2025-05-13 PROCEDURE — 07T74ZZ RESECTION OF THORAX LYMPHATIC, PERCUTANEOUS ENDOSCOPIC APPROACH: ICD-10-PCS | Performed by: STUDENT IN AN ORGANIZED HEALTH CARE EDUCATION/TRAINING PROGRAM

## 2025-05-13 PROCEDURE — 7100000002 HC RECOVERY ROOM TIME - EACH INCREMENTAL 1 MINUTE: Performed by: STUDENT IN AN ORGANIZED HEALTH CARE EDUCATION/TRAINING PROGRAM

## 2025-05-13 PROCEDURE — 32663 THORACOSCOPY W/LOBECTOMY: CPT | Performed by: PHYSICIAN ASSISTANT

## 2025-05-13 PROCEDURE — 36415 COLL VENOUS BLD VENIPUNCTURE: CPT | Performed by: STUDENT IN AN ORGANIZED HEALTH CARE EDUCATION/TRAINING PROGRAM

## 2025-05-13 PROCEDURE — 02BN4ZX EXCISION OF PERICARDIUM, PERCUTANEOUS ENDOSCOPIC APPROACH, DIAGNOSTIC: ICD-10-PCS | Performed by: STUDENT IN AN ORGANIZED HEALTH CARE EDUCATION/TRAINING PROGRAM

## 2025-05-13 PROCEDURE — 2500000001 HC RX 250 WO HCPCS SELF ADMINISTERED DRUGS (ALT 637 FOR MEDICARE OP): Performed by: PHYSICIAN ASSISTANT

## 2025-05-13 PROCEDURE — 2500000004 HC RX 250 GENERAL PHARMACY W/ HCPCS (ALT 636 FOR OP/ED): Performed by: STUDENT IN AN ORGANIZED HEALTH CARE EDUCATION/TRAINING PROGRAM

## 2025-05-13 PROCEDURE — 3600000017 HC OR TIME - EACH INCREMENTAL 1 MINUTE - PROCEDURE LEVEL SIX: Performed by: STUDENT IN AN ORGANIZED HEALTH CARE EDUCATION/TRAINING PROGRAM

## 2025-05-13 PROCEDURE — 88342 IMHCHEM/IMCYTCHM 1ST ANTB: CPT | Performed by: PATHOLOGY

## 2025-05-13 PROCEDURE — 88305 TISSUE EXAM BY PATHOLOGIST: CPT | Performed by: PATHOLOGY

## 2025-05-13 PROCEDURE — 96372 THER/PROPH/DIAG INJ SC/IM: CPT | Performed by: STUDENT IN AN ORGANIZED HEALTH CARE EDUCATION/TRAINING PROGRAM

## 2025-05-13 PROCEDURE — 0BTB4ZZ RESECTION OF LEFT LOWER LOBE BRONCHUS, PERCUTANEOUS ENDOSCOPIC APPROACH: ICD-10-PCS | Performed by: STUDENT IN AN ORGANIZED HEALTH CARE EDUCATION/TRAINING PROGRAM

## 2025-05-13 PROCEDURE — 71045 X-RAY EXAM CHEST 1 VIEW: CPT

## 2025-05-13 PROCEDURE — 1200000002 HC GENERAL ROOM WITH TELEMETRY DAILY

## 2025-05-13 PROCEDURE — 86900 BLOOD TYPING SEROLOGIC ABO: CPT | Performed by: STUDENT IN AN ORGANIZED HEALTH CARE EDUCATION/TRAINING PROGRAM

## 2025-05-13 PROCEDURE — 2500000004 HC RX 250 GENERAL PHARMACY W/ HCPCS (ALT 636 FOR OP/ED): Performed by: PHYSICIAN ASSISTANT

## 2025-05-13 PROCEDURE — 36620 INSERTION CATHETER ARTERY: CPT

## 2025-05-13 PROCEDURE — 3700000001 HC GENERAL ANESTHESIA TIME - INITIAL BASE CHARGE: Performed by: STUDENT IN AN ORGANIZED HEALTH CARE EDUCATION/TRAINING PROGRAM

## 2025-05-13 PROCEDURE — 88305 TISSUE EXAM BY PATHOLOGIST: CPT | Mod: TC,SUR | Performed by: STUDENT IN AN ORGANIZED HEALTH CARE EDUCATION/TRAINING PROGRAM

## 2025-05-13 PROCEDURE — 2720000007 HC OR 272 NO HCPCS: Performed by: STUDENT IN AN ORGANIZED HEALTH CARE EDUCATION/TRAINING PROGRAM

## 2025-05-13 PROCEDURE — 32674 THORACOSCOPY LYMPH NODE EXC: CPT | Performed by: STUDENT IN AN ORGANIZED HEALTH CARE EDUCATION/TRAINING PROGRAM

## 2025-05-13 PROCEDURE — 7100000001 HC RECOVERY ROOM TIME - INITIAL BASE CHARGE: Performed by: STUDENT IN AN ORGANIZED HEALTH CARE EDUCATION/TRAINING PROGRAM

## 2025-05-13 RX ORDER — IPRATROPIUM BROMIDE AND ALBUTEROL SULFATE 2.5; .5 MG/3ML; MG/3ML
3 SOLUTION RESPIRATORY (INHALATION) EVERY 6 HOURS PRN
Status: DISCONTINUED | OUTPATIENT
Start: 2025-05-13 | End: 2025-05-15 | Stop reason: HOSPADM

## 2025-05-13 RX ORDER — HEPARIN SODIUM 5000 [USP'U]/ML
5000 INJECTION, SOLUTION INTRAVENOUS; SUBCUTANEOUS ONCE
Status: COMPLETED | OUTPATIENT
Start: 2025-05-13 | End: 2025-05-13

## 2025-05-13 RX ORDER — ACETAMINOPHEN 325 MG/1
975 TABLET ORAL EVERY 6 HOURS PRN
Status: DISCONTINUED | OUTPATIENT
Start: 2025-05-13 | End: 2025-05-13 | Stop reason: HOSPADM

## 2025-05-13 RX ORDER — OXYCODONE HYDROCHLORIDE 5 MG/1
5 TABLET ORAL EVERY 4 HOURS PRN
Status: DISCONTINUED | OUTPATIENT
Start: 2025-05-13 | End: 2025-05-14

## 2025-05-13 RX ORDER — ALBUTEROL SULFATE 0.83 MG/ML
2.5 SOLUTION RESPIRATORY (INHALATION) ONCE AS NEEDED
Status: DISCONTINUED | OUTPATIENT
Start: 2025-05-13 | End: 2025-05-13 | Stop reason: HOSPADM

## 2025-05-13 RX ORDER — MIDAZOLAM HYDROCHLORIDE 1 MG/ML
INJECTION INTRAMUSCULAR; INTRAVENOUS AS NEEDED
Status: DISCONTINUED | OUTPATIENT
Start: 2025-05-13 | End: 2025-05-13

## 2025-05-13 RX ORDER — AMOXICILLIN 250 MG
2 CAPSULE ORAL 2 TIMES DAILY
Status: DISCONTINUED | OUTPATIENT
Start: 2025-05-13 | End: 2025-05-15 | Stop reason: HOSPADM

## 2025-05-13 RX ORDER — NORETHINDRONE AND ETHINYL ESTRADIOL 0.5-0.035
KIT ORAL AS NEEDED
Status: DISCONTINUED | OUTPATIENT
Start: 2025-05-13 | End: 2025-05-13

## 2025-05-13 RX ORDER — HYDROMORPHONE HYDROCHLORIDE 0.2 MG/ML
0.2 INJECTION INTRAMUSCULAR; INTRAVENOUS; SUBCUTANEOUS EVERY 5 MIN PRN
Status: DISCONTINUED | OUTPATIENT
Start: 2025-05-13 | End: 2025-05-13 | Stop reason: HOSPADM

## 2025-05-13 RX ORDER — METHADONE IN SOD CHLOR,ISO-OSM 10 MG/ML
SYRINGE (ML) INTRAVENOUS AS NEEDED
Status: DISCONTINUED | OUTPATIENT
Start: 2025-05-13 | End: 2025-05-13

## 2025-05-13 RX ORDER — BUPIVACAINE HCL/EPINEPHRINE 0.25-.0005
VIAL (ML) INJECTION AS NEEDED
Status: DISCONTINUED | OUTPATIENT
Start: 2025-05-13 | End: 2025-05-13 | Stop reason: HOSPADM

## 2025-05-13 RX ORDER — METOPROLOL TARTRATE 25 MG/1
12.5 TABLET, FILM COATED ORAL EVERY 8 HOURS SCHEDULED
Status: DISCONTINUED | OUTPATIENT
Start: 2025-05-13 | End: 2025-05-15 | Stop reason: HOSPADM

## 2025-05-13 RX ORDER — PROPOFOL 10 MG/ML
INJECTION, EMULSION INTRAVENOUS AS NEEDED
Status: DISCONTINUED | OUTPATIENT
Start: 2025-05-13 | End: 2025-05-13

## 2025-05-13 RX ORDER — ONDANSETRON HYDROCHLORIDE 2 MG/ML
INJECTION, SOLUTION INTRAVENOUS AS NEEDED
Status: DISCONTINUED | OUTPATIENT
Start: 2025-05-13 | End: 2025-05-13

## 2025-05-13 RX ORDER — LIDOCAINE 560 MG/1
1 PATCH PERCUTANEOUS; TOPICAL; TRANSDERMAL DAILY
Status: DISCONTINUED | OUTPATIENT
Start: 2025-05-13 | End: 2025-05-15 | Stop reason: HOSPADM

## 2025-05-13 RX ORDER — ONDANSETRON HYDROCHLORIDE 2 MG/ML
4 INJECTION, SOLUTION INTRAVENOUS EVERY 8 HOURS PRN
Status: DISCONTINUED | OUTPATIENT
Start: 2025-05-13 | End: 2025-05-15 | Stop reason: HOSPADM

## 2025-05-13 RX ORDER — ONDANSETRON HYDROCHLORIDE 2 MG/ML
4 INJECTION, SOLUTION INTRAVENOUS ONCE AS NEEDED
Status: DISCONTINUED | OUTPATIENT
Start: 2025-05-13 | End: 2025-05-13 | Stop reason: HOSPADM

## 2025-05-13 RX ORDER — IBUPROFEN 200 MG
1 TABLET ORAL EVERY 24 HOURS
Status: DISCONTINUED | OUTPATIENT
Start: 2025-05-13 | End: 2025-05-15 | Stop reason: HOSPADM

## 2025-05-13 RX ORDER — DROPERIDOL 2.5 MG/ML
0.62 INJECTION, SOLUTION INTRAMUSCULAR; INTRAVENOUS ONCE AS NEEDED
Status: DISCONTINUED | OUTPATIENT
Start: 2025-05-13 | End: 2025-05-13 | Stop reason: HOSPADM

## 2025-05-13 RX ORDER — LABETALOL HYDROCHLORIDE 5 MG/ML
5 INJECTION, SOLUTION INTRAVENOUS ONCE AS NEEDED
Status: DISCONTINUED | OUTPATIENT
Start: 2025-05-13 | End: 2025-05-13 | Stop reason: HOSPADM

## 2025-05-13 RX ORDER — CEFAZOLIN SODIUM 2 G/100ML
2 INJECTION, SOLUTION INTRAVENOUS EVERY 8 HOURS
Status: COMPLETED | OUTPATIENT
Start: 2025-05-13 | End: 2025-05-14

## 2025-05-13 RX ORDER — OXYCODONE HYDROCHLORIDE 10 MG/1
10 TABLET ORAL EVERY 4 HOURS PRN
Status: DISCONTINUED | OUTPATIENT
Start: 2025-05-13 | End: 2025-05-14

## 2025-05-13 RX ORDER — FENTANYL CITRATE 50 UG/ML
INJECTION, SOLUTION INTRAMUSCULAR; INTRAVENOUS AS NEEDED
Status: DISCONTINUED | OUTPATIENT
Start: 2025-05-13 | End: 2025-05-13

## 2025-05-13 RX ORDER — ROCURONIUM BROMIDE 10 MG/ML
INJECTION, SOLUTION INTRAVENOUS AS NEEDED
Status: DISCONTINUED | OUTPATIENT
Start: 2025-05-13 | End: 2025-05-13

## 2025-05-13 RX ORDER — NALOXONE HYDROCHLORIDE 0.4 MG/ML
0.2 INJECTION, SOLUTION INTRAMUSCULAR; INTRAVENOUS; SUBCUTANEOUS EVERY 5 MIN PRN
Status: DISCONTINUED | OUTPATIENT
Start: 2025-05-13 | End: 2025-05-15 | Stop reason: HOSPADM

## 2025-05-13 RX ORDER — ATORVASTATIN CALCIUM 20 MG/1
20 TABLET, FILM COATED ORAL DAILY
Status: DISCONTINUED | OUTPATIENT
Start: 2025-05-14 | End: 2025-05-15 | Stop reason: HOSPADM

## 2025-05-13 RX ORDER — PROCHLORPERAZINE EDISYLATE 5 MG/ML
5 INJECTION INTRAMUSCULAR; INTRAVENOUS ONCE AS NEEDED
Status: DISCONTINUED | OUTPATIENT
Start: 2025-05-13 | End: 2025-05-13 | Stop reason: HOSPADM

## 2025-05-13 RX ORDER — ACETAMINOPHEN 10 MG/ML
INJECTION, SOLUTION INTRAVENOUS AS NEEDED
Status: DISCONTINUED | OUTPATIENT
Start: 2025-05-13 | End: 2025-05-13

## 2025-05-13 RX ORDER — HYDRALAZINE HYDROCHLORIDE 20 MG/ML
5 INJECTION INTRAMUSCULAR; INTRAVENOUS EVERY 30 MIN PRN
Status: DISCONTINUED | OUTPATIENT
Start: 2025-05-13 | End: 2025-05-13 | Stop reason: HOSPADM

## 2025-05-13 RX ORDER — KETOROLAC TROMETHAMINE 30 MG/ML
INJECTION, SOLUTION INTRAMUSCULAR; INTRAVENOUS AS NEEDED
Status: DISCONTINUED | OUTPATIENT
Start: 2025-05-13 | End: 2025-05-13

## 2025-05-13 RX ORDER — PHENYLEPHRINE HCL IN 0.9% NACL 0.4MG/10ML
SYRINGE (ML) INTRAVENOUS AS NEEDED
Status: DISCONTINUED | OUTPATIENT
Start: 2025-05-13 | End: 2025-05-13

## 2025-05-13 RX ORDER — HEPARIN SODIUM 5000 [USP'U]/ML
5000 INJECTION, SOLUTION INTRAVENOUS; SUBCUTANEOUS EVERY 8 HOURS
Status: DISCONTINUED | OUTPATIENT
Start: 2025-05-13 | End: 2025-05-15 | Stop reason: HOSPADM

## 2025-05-13 RX ORDER — LIDOCAINE HYDROCHLORIDE 20 MG/ML
INJECTION, SOLUTION INFILTRATION; PERINEURAL AS NEEDED
Status: DISCONTINUED | OUTPATIENT
Start: 2025-05-13 | End: 2025-05-13

## 2025-05-13 RX ORDER — CEFAZOLIN SODIUM 2 G/100ML
2 INJECTION, SOLUTION INTRAVENOUS ONCE
Status: DISCONTINUED | OUTPATIENT
Start: 2025-05-13 | End: 2025-05-13

## 2025-05-13 RX ORDER — OXYCODONE HYDROCHLORIDE 5 MG/1
10 TABLET ORAL EVERY 4 HOURS PRN
Status: DISCONTINUED | OUTPATIENT
Start: 2025-05-13 | End: 2025-05-13 | Stop reason: HOSPADM

## 2025-05-13 RX ORDER — ACETAMINOPHEN 325 MG/1
650 TABLET ORAL EVERY 6 HOURS
Status: DISCONTINUED | OUTPATIENT
Start: 2025-05-13 | End: 2025-05-15 | Stop reason: HOSPADM

## 2025-05-13 RX ORDER — OXYCODONE HYDROCHLORIDE 5 MG/1
5 TABLET ORAL EVERY 4 HOURS PRN
Status: DISCONTINUED | OUTPATIENT
Start: 2025-05-13 | End: 2025-05-13 | Stop reason: HOSPADM

## 2025-05-13 RX ORDER — CEFAZOLIN 1 G/1
INJECTION, POWDER, FOR SOLUTION INTRAVENOUS AS NEEDED
Status: DISCONTINUED | OUTPATIENT
Start: 2025-05-13 | End: 2025-05-13

## 2025-05-13 RX ADMIN — LIDOCAINE HYDROCHLORIDE 100 MG: 20 INJECTION, SOLUTION INFILTRATION; PERINEURAL at 07:33

## 2025-05-13 RX ADMIN — SODIUM CHLORIDE, SODIUM LACTATE, POTASSIUM CHLORIDE, AND CALCIUM CHLORIDE: 600; 310; 30; 20 INJECTION, SOLUTION INTRAVENOUS at 07:28

## 2025-05-13 RX ADMIN — CEFAZOLIN SODIUM 2 G: 2 INJECTION, SOLUTION INTRAVENOUS at 22:36

## 2025-05-13 RX ADMIN — HEPARIN SODIUM 5000 UNITS: 5000 INJECTION, SOLUTION INTRAVENOUS; SUBCUTANEOUS at 06:53

## 2025-05-13 RX ADMIN — PROPOFOL 40 MG: 10 INJECTION, EMULSION INTRAVENOUS at 08:01

## 2025-05-13 RX ADMIN — CEFAZOLIN 2 G: 1 INJECTION, POWDER, FOR SOLUTION INTRAMUSCULAR; INTRAVENOUS at 08:00

## 2025-05-13 RX ADMIN — ROCURONIUM BROMIDE 10 MG: 10 INJECTION, SOLUTION INTRAVENOUS at 08:38

## 2025-05-13 RX ADMIN — METOPROLOL TARTRATE 12.5 MG: 25 TABLET, FILM COATED ORAL at 22:32

## 2025-05-13 RX ADMIN — ACETAMINOPHEN 1000 MG: 10 INJECTION, SOLUTION INTRAVENOUS at 09:42

## 2025-05-13 RX ADMIN — HEPARIN SODIUM 5000 UNITS: 5000 INJECTION, SOLUTION INTRAVENOUS; SUBCUTANEOUS at 22:46

## 2025-05-13 RX ADMIN — FENTANYL CITRATE 50 MCG: 50 INJECTION, SOLUTION INTRAMUSCULAR; INTRAVENOUS at 07:33

## 2025-05-13 RX ADMIN — SENNOSIDES AND DOCUSATE SODIUM 2 TABLET: 50; 8.6 TABLET ORAL at 22:32

## 2025-05-13 RX ADMIN — ROCURONIUM BROMIDE 50 MG: 10 INJECTION, SOLUTION INTRAVENOUS at 07:34

## 2025-05-13 RX ADMIN — ONDANSETRON 4 MG: 2 INJECTION, SOLUTION INTRAMUSCULAR; INTRAVENOUS at 09:46

## 2025-05-13 RX ADMIN — Medication 10 MG: at 08:38

## 2025-05-13 RX ADMIN — DEXAMETHASONE SODIUM PHOSPHATE 4 MG: 4 INJECTION INTRA-ARTICULAR; INTRALESIONAL; INTRAMUSCULAR; INTRAVENOUS; SOFT TISSUE at 08:00

## 2025-05-13 RX ADMIN — Medication 160 MCG: at 07:45

## 2025-05-13 RX ADMIN — SUGAMMADEX 200 MG: 100 INJECTION, SOLUTION INTRAVENOUS at 10:02

## 2025-05-13 RX ADMIN — OXYCODONE HYDROCHLORIDE 10 MG: 10 TABLET ORAL at 22:35

## 2025-05-13 RX ADMIN — KETOROLAC TROMETHAMINE 15 MG: 30 INJECTION, SOLUTION INTRAMUSCULAR; INTRAVENOUS at 09:41

## 2025-05-13 RX ADMIN — NORETHINDRONE AND ETHINYL ESTRADIOL 25 MG: KIT ORAL at 08:50

## 2025-05-13 RX ADMIN — PROPOFOL 20 MG: 10 INJECTION, EMULSION INTRAVENOUS at 09:52

## 2025-05-13 RX ADMIN — LIDOCAINE 1 PATCH: 4 PATCH TOPICAL at 22:33

## 2025-05-13 RX ADMIN — Medication 10 MG: at 07:33

## 2025-05-13 RX ADMIN — MIDAZOLAM HYDROCHLORIDE 2 MG: 2 INJECTION, SOLUTION INTRAMUSCULAR; INTRAVENOUS at 07:28

## 2025-05-13 RX ADMIN — Medication 50 MG: at 07:33

## 2025-05-13 RX ADMIN — Medication 80 MCG: at 08:32

## 2025-05-13 RX ADMIN — Medication 120 MCG: at 08:12

## 2025-05-13 RX ADMIN — Medication 40 MCG: at 07:33

## 2025-05-13 RX ADMIN — PROPOFOL 30 MG: 10 INJECTION, EMULSION INTRAVENOUS at 08:23

## 2025-05-13 RX ADMIN — FENTANYL CITRATE 50 MCG: 50 INJECTION, SOLUTION INTRAMUSCULAR; INTRAVENOUS at 09:50

## 2025-05-13 RX ADMIN — ACETAMINOPHEN 650 MG: 325 TABLET ORAL at 22:33

## 2025-05-13 RX ADMIN — PROPOFOL 20 MG: 10 INJECTION, EMULSION INTRAVENOUS at 09:47

## 2025-05-13 RX ADMIN — EPHEDRINE SULFATE 5 MG: 50 INJECTION INTRAVENOUS at 08:50

## 2025-05-13 RX ADMIN — PROPOFOL 90 MG: 10 INJECTION, EMULSION INTRAVENOUS at 07:33

## 2025-05-13 SDOH — SOCIAL STABILITY: SOCIAL INSECURITY: DOES ANYONE TRY TO KEEP YOU FROM HAVING/CONTACTING OTHER FRIENDS OR DOING THINGS OUTSIDE YOUR HOME?: NO

## 2025-05-13 SDOH — ECONOMIC STABILITY: FOOD INSECURITY: WITHIN THE PAST 12 MONTHS, THE FOOD YOU BOUGHT JUST DIDN'T LAST AND YOU DIDN'T HAVE MONEY TO GET MORE.: NEVER TRUE

## 2025-05-13 SDOH — SOCIAL STABILITY: SOCIAL INSECURITY: DO YOU FEEL ANYONE HAS EXPLOITED OR TAKEN ADVANTAGE OF YOU FINANCIALLY OR OF YOUR PERSONAL PROPERTY?: NO

## 2025-05-13 SDOH — ECONOMIC STABILITY: HOUSING INSECURITY: IN THE LAST 12 MONTHS, WAS THERE A TIME WHEN YOU WERE NOT ABLE TO PAY THE MORTGAGE OR RENT ON TIME?: NO

## 2025-05-13 SDOH — SOCIAL STABILITY: SOCIAL INSECURITY
WITHIN THE LAST YEAR, HAVE YOU BEEN KICKED, HIT, SLAPPED, OR OTHERWISE PHYSICALLY HURT BY YOUR PARTNER OR EX-PARTNER?: NO

## 2025-05-13 SDOH — SOCIAL STABILITY: SOCIAL INSECURITY: WITHIN THE LAST YEAR, HAVE YOU BEEN HUMILIATED OR EMOTIONALLY ABUSED IN OTHER WAYS BY YOUR PARTNER OR EX-PARTNER?: NO

## 2025-05-13 SDOH — ECONOMIC STABILITY: INCOME INSECURITY: IN THE PAST 12 MONTHS HAS THE ELECTRIC, GAS, OIL, OR WATER COMPANY THREATENED TO SHUT OFF SERVICES IN YOUR HOME?: NO

## 2025-05-13 SDOH — ECONOMIC STABILITY: HOUSING INSECURITY: AT ANY TIME IN THE PAST 12 MONTHS, WERE YOU HOMELESS OR LIVING IN A SHELTER (INCLUDING NOW)?: NO

## 2025-05-13 SDOH — SOCIAL STABILITY: SOCIAL INSECURITY: DO YOU FEEL UNSAFE GOING BACK TO THE PLACE WHERE YOU ARE LIVING?: NO

## 2025-05-13 SDOH — ECONOMIC STABILITY: HOUSING INSECURITY: DO YOU FEEL UNSAFE GOING BACK TO THE PLACE WHERE YOU LIVE?: NO

## 2025-05-13 SDOH — ECONOMIC STABILITY: FOOD INSECURITY: WITHIN THE PAST 12 MONTHS, YOU WORRIED THAT YOUR FOOD WOULD RUN OUT BEFORE YOU GOT THE MONEY TO BUY MORE.: NEVER TRUE

## 2025-05-13 SDOH — ECONOMIC STABILITY: HOUSING INSECURITY: IN THE PAST 12 MONTHS, HOW MANY TIMES HAVE YOU MOVED WHERE YOU WERE LIVING?: 0

## 2025-05-13 SDOH — SOCIAL STABILITY: SOCIAL INSECURITY
WITHIN THE LAST YEAR, HAVE YOU BEEN RAPED OR FORCED TO HAVE ANY KIND OF SEXUAL ACTIVITY BY YOUR PARTNER OR EX-PARTNER?: NO

## 2025-05-13 SDOH — SOCIAL STABILITY: SOCIAL INSECURITY: WERE YOU ABLE TO COMPLETE ALL THE BEHAVIORAL HEALTH SCREENINGS?: YES

## 2025-05-13 SDOH — SOCIAL STABILITY: SOCIAL INSECURITY: WITHIN THE LAST YEAR, HAVE YOU BEEN AFRAID OF YOUR PARTNER OR EX-PARTNER?: NO

## 2025-05-13 SDOH — SOCIAL STABILITY: SOCIAL INSECURITY: HAVE YOU HAD ANY THOUGHTS OF HARMING ANYONE ELSE?: NO

## 2025-05-13 SDOH — ECONOMIC STABILITY: FOOD INSECURITY: HOW HARD IS IT FOR YOU TO PAY FOR THE VERY BASICS LIKE FOOD, HOUSING, MEDICAL CARE, AND HEATING?: NOT VERY HARD

## 2025-05-13 SDOH — SOCIAL STABILITY: SOCIAL INSECURITY: HAVE YOU HAD THOUGHTS OF HARMING ANYONE ELSE?: NO

## 2025-05-13 SDOH — SOCIAL STABILITY: SOCIAL INSECURITY: ARE YOU OR HAVE YOU BEEN THREATENED OR ABUSED PHYSICALLY, EMOTIONALLY, OR SEXUALLY BY ANYONE?: NO

## 2025-05-13 SDOH — SOCIAL STABILITY: SOCIAL INSECURITY: ABUSE: ADULT

## 2025-05-13 SDOH — HEALTH STABILITY: MENTAL HEALTH: CURRENT SMOKER: 1

## 2025-05-13 SDOH — SOCIAL STABILITY: SOCIAL INSECURITY: HAS ANYONE EVER THREATENED TO HURT YOUR FAMILY OR YOUR PETS?: NO

## 2025-05-13 SDOH — SOCIAL STABILITY: SOCIAL INSECURITY: ARE THERE ANY APPARENT SIGNS OF INJURIES/BEHAVIORS THAT COULD BE RELATED TO ABUSE/NEGLECT?: NO

## 2025-05-13 SDOH — ECONOMIC STABILITY: TRANSPORTATION INSECURITY: IN THE PAST 12 MONTHS, HAS LACK OF TRANSPORTATION KEPT YOU FROM MEDICAL APPOINTMENTS OR FROM GETTING MEDICATIONS?: NO

## 2025-05-13 ASSESSMENT — PAIN SCALES - GENERAL

## 2025-05-13 ASSESSMENT — ACTIVITIES OF DAILY LIVING (ADL)
ADEQUATE_TO_COMPLETE_ADL: YES
JUDGMENT_ADEQUATE_SAFELY_COMPLETE_DAILY_ACTIVITIES: YES
DRESSING YOURSELF: INDEPENDENT
GROOMING: INDEPENDENT
ADEQUATE_TO_COMPLETE_ADL: YES
WALKS IN HOME: INDEPENDENT
HEARING - LEFT EAR: FUNCTIONAL
HEARING - RIGHT EAR: FUNCTIONAL
HEARING - RIGHT EAR: FUNCTIONAL
LACK_OF_TRANSPORTATION: NO
FEEDING YOURSELF: INDEPENDENT
BATHING: INDEPENDENT
BATHING: INDEPENDENT
JUDGMENT_ADEQUATE_SAFELY_COMPLETE_DAILY_ACTIVITIES: YES
TOILETING: INDEPENDENT
PATIENT'S MEMORY ADEQUATE TO SAFELY COMPLETE DAILY ACTIVITIES?: YES
PATIENT'S MEMORY ADEQUATE TO SAFELY COMPLETE DAILY ACTIVITIES?: YES
FEEDING YOURSELF: INDEPENDENT
WALKS IN HOME: INDEPENDENT
DRESSING YOURSELF: INDEPENDENT
TOILETING: INDEPENDENT
GROOMING: INDEPENDENT
LACK_OF_TRANSPORTATION: NO

## 2025-05-13 ASSESSMENT — LIFESTYLE VARIABLES
HOW OFTEN DO YOU HAVE A DRINK CONTAINING ALCOHOL: MONTHLY OR LESS
SKIP TO QUESTIONS 9-10: 1
HOW OFTEN DO YOU HAVE 6 OR MORE DRINKS ON ONE OCCASION: NEVER
AUDIT-C TOTAL SCORE: 1
HOW MANY STANDARD DRINKS CONTAINING ALCOHOL DO YOU HAVE ON A TYPICAL DAY: 1 OR 2
AUDIT-C TOTAL SCORE: 1

## 2025-05-13 ASSESSMENT — COLUMBIA-SUICIDE SEVERITY RATING SCALE - C-SSRS
1. IN THE PAST MONTH, HAVE YOU WISHED YOU WERE DEAD OR WISHED YOU COULD GO TO SLEEP AND NOT WAKE UP?: NO
6. HAVE YOU EVER DONE ANYTHING, STARTED TO DO ANYTHING, OR PREPARED TO DO ANYTHING TO END YOUR LIFE?: NO
2. HAVE YOU ACTUALLY HAD ANY THOUGHTS OF KILLING YOURSELF?: NO

## 2025-05-13 ASSESSMENT — PAIN - FUNCTIONAL ASSESSMENT

## 2025-05-13 ASSESSMENT — COGNITIVE AND FUNCTIONAL STATUS - GENERAL
MOBILITY SCORE: 24
PATIENT BASELINE BEDBOUND: NO
DAILY ACTIVITIY SCORE: 24

## 2025-05-13 NOTE — INTERVAL H&P NOTE
H&P reviewed. The patient was examined and there are no changes to the H&P. See initial clinic note below.    Karlene Vilchis MD  General Surgery      HPI:   Lilly Nina is a 65 y.o. female with a pmhx of HTN, HLD, and current smoker who is referred to me by Ms Urbina for evaluation and treatment of lung mass. The lung mass is found incidentally on screening CT scan. Patient is doing well clinically. She is active and denied shortness of breath or dyspnea on exertion. Her weight has been stable. She denied history of MI or stroke. She is current smoker about 1 pack per day for 40 years. She has not tried any therapy to help her quit but is willing to try. She denied recent illness, fever, chills, chest pain, chest pressure, nausea or emesis.      PMHx: per HPI  PSHx: hip replacement trigger finger release  SHx: current smoker (40ppy), social ETOH, deny illicit drugs   FMHx: negative for history of cancer. Biological mother side had unknown cardiac disease    Current Medications      Current Outpatient Medications:     ascorbic acid (Vitamin C) 1,000 mg tablet, Take 1 tablet (1,000 mg) by mouth once daily., Disp: , Rfl:     cholecalciferol (Vitamin D-3) 50 mcg (2,000 unit) capsule, Take by mouth., Disp: , Rfl:     cyanocobalamin (Vitamin B-12) 1,000 mcg tablet, Take by mouth., Disp: , Rfl:     doxylamine (Unisom, doxylamine,) 25 mg tablet, Take 1 tablet (25 mg) by mouth once daily at bedtime., Disp: , Rfl:     lisinopril 5 mg tablet, TAKE 1 TABLET DAILY, Disp: 90 tablet, Rfl: 0    traZODone (Desyrel) 100 mg tablet, Take 1 tablet (100 mg) by mouth as needed at bedtime for sleep., Disp: 90 tablet, Rfl: 0      RX Allergies   No Known Allergies         ROS  General: negative for fever, chills, weight loss, night sweat  Head: negative for severe headache, vision change, blurred vision,   CV: negative for chest pain, dizziness, lightheadedness   Pulm: negative for shortness of breath, dyspnea on exertion,  hemoptysis  GI: negative for diarrhea, constipation, abdominal pain, nausea or vomiting, BRBPR  : negative for dysuria, hematuria, incontinence  Skin: negative for rash  Heme: negative for blood thinner, bleeding disorder or clotting disorder  Endo: negative for heat or cold intolerance, weight gain or weight loss  MSK: negative for rash, edema, weakness     PHYSICAL EXAM  Visit Vitals  /58 (BP Location: Left arm, Patient Position: Sitting, BP Cuff Size: Small adult)   Pulse 89   Temp 36.5 °C (97.7 °F) (Temporal)   Resp 16   Constitution: well-developed well-nourished female sitting in chair in no acute distress  HEENT: NCAT, moist mucosal membrane, neck supple, no crepitus, sclera anicteric  Lymph nodes: no cervical or supraclavicular lymphadenopathy  Cardiac: RRR, normal S1, S2, no mrg  Pulmonary: normal air movement, CTAP, no wcr  Abdomen: soft, non-distended, non-tender, no rigidity, guarding or rebound tenderness, no splenohepatomegaly  Neuro: AOx3, CNII-XII grossly normal  Ext: warm, dry, no edema noted  Skin: dry, clean and intact  Psych: mood and affect wnl     Assessment and Plan:  This is a 65 y.o. female current smoker with new found lung mass  I reviewed the CT scan from 3/19/25 and the PET/CT from 3/26/25. It showed LLL centrally located spiculated lung mass about 37mm in size. This mass is strongly FDG avid with mSUV of 13.  --no mediastinal lymphadenopathy noted  --no distant or mediastinal FDG avidity noted  I reviewed the labs from 2/20/25. It showed normal H/H and hypercalcemia.     In my opinion, this high risk patient presents with a LLL lung mass. This is highly concerning for primary lung cancer. Imaging workup so far showed localized disease in the LLL. I recommended EBUS with navigation bronch to obtain a biopsy and mediastinal staging. If cancer is confirmed on EBUS, she will need a brain MRI to complete staging. I will also obtain a pulmonary function test to assess baseline lung  function. I will bring her back to discuss results. We also discussed briefly regarding lung cancer. If localized disease is confirmed, surgical resection is treatment of choice with good long term cure rate. If advanced lung cancer is confirmed, she will need a combination of chemotherapy with possible surgical resection.      We spent 5 minutes today discussing the patient's current 1 pack per day cigarette dependence; the effects of smoking on cancer treatment and recovery for lung surgery; the effect on developing new cancers; and a counseling plan for quitting. After discussing pharmacotherapy options, the patient elected to begin nicotine patch and use the gradual quit approach.     I had a candid discussion with the patient. I outlined the treatment plan as above. The patient consented to proceed.      Florencio Morrow MD  Thoracic Surgeon  [facility]   of Medicine  [facility]  Office phone: [phone]  Fax: [phone]  Pager: [phone]     Amount of time spent:  -Prep time on date of patient encounter: 20 min  -Time spend with patient/family/caregiver: 30 min  -Additional time spent on patient care activities: 15 min  -Documentation time in medical record: 15 min  -Other time spent on date of patient encounter: 0 min  Total time: 80 min

## 2025-05-13 NOTE — ANESTHESIA PROCEDURE NOTES
Arterial Line:    Date/Time: 5/13/2025 7:57 AM    Staffing  Performed: resident   Authorized by: Maynor Meyers MD    Performed by: Brigitte Padron DO    An arterial line was placed. Procedure performed using surface landmarks.in the OR for the following indication(s): continuous blood pressure monitoring and blood sampling needed.    A 20 gauge (size), 1 and 1/4 inch (length), Angiocath (type) catheter was placed into the Right radial artery, secured by Tegadedorian   Seldinger technique used.  Events:  patient tolerated procedure well with no complications.      Additional notes:  20g Right radial art line placed

## 2025-05-13 NOTE — BRIEF OP NOTE
Date: 2025  OR Location: [facility] OR    Name: Lilly Nina, : 1960, Age: 65 y.o., MRN: [id number], Sex: female    Diagnosis  Pre-op Diagnosis      * NSCLC of left lung (Multi) [C34.92] Post-op Diagnosis     * NSCLC of left lung (Multi) [C34.92]     Procedures  Bronchoscopy, Robotic Assisted Left Lower Lobectomy, Mediastinal Lymph Node Dissection, Intercostal Nerve Blocks     Surgeons      * Florencio Morrow - Primary    Resident/Fellow/Other Assistant:  Surgeons and Role:     * Karlene Vilchis MD - Resident - Assisting     * Marianna Valenzuela PA-C - BETTYE First Assist  MALIK Ivy    Staff:   Scrub Person:   Shiraulator: Yanet  Scrub Person: Leigha    Anesthesia Staff: Anesthesiologist: Maynor Meyers MD; Av Martinez MD PhD  Anesthesia Resident: Brigitte Padron DO    Procedure Summary  Anesthesia: General  ASA: III  Estimated Blood Loss: 5mL  Intra-op Medications:   Administrations occurring from 0645 to 1100 on 25:   Medication Name Total Dose   BUPivacaine-EPINEPHrine (Marcaine w/EPI) 0.25 %-1:200,000 injection 40 mL   heparin (porcine) injection 5,000 Units 5,000 Units   acetaminophen (Ofirmev) injection 1,000 mg   ceFAZolin (Ancef) vial 1 g 2 g   dexAMETHasone (Decadron) 4 mg/mL 4 mg   ePHEDrine injection 5 mg   ePHEDrine injection 25 mg   fentaNYL (Sublimaze) injection 50 mcg/mL 100 mcg   ketamine injection 50 mg/ 5 mL (10 mg/mL) 60 mg   ketorolac (Toradol) injection 30 mg 15 mg   LR bolus Cannot be calculated   lidocaine (Xylocaine) injection 2 % 100 mg   methadone (Dolophine) injection 10 mg   midazolam PF (Versed) injection 1 mg/mL 2 mg   ondansetron 2 mg/mL 4 mg   phenylephrine 40 mcg/mL syringe 10 mL 400 mcg   propofol (Diprivan) injection 10 mg/mL 200 mg   rocuronium (ZeMuron) 50 mg/5 mL injection 60 mg   sugammadex (Bridion) 200 mg/2 mL injection 200 mg          Anesthesia Record               Intraprocedure I/O Totals          Intake    LR bolus 350.00 mL     acetaminophen (Ofirmev) injection 100.00 mL    Total Intake 450 mL       Output    Urine 105 mL    Est. Blood Loss 25 mL    Total Output 130 mL       Net    Net Volume 320 mL          Specimen:   ID Type Source Tests Collected by Time   1 : LEVEL 9 LYMPH NODE Tissue LYMPH NODE PULMONARY (SPECIFY SITE) SURGICAL PATHOLOGY EXAM Florencio Morrow MD 5/13/2025 0839   2 : LEVEL 7 LYMPH NODE Tissue LYMPH NODE PULMONARY (SPECIFY SITE) SURGICAL PATHOLOGY EXAM Florencio Morrow MD 5/13/2025 0844   3 : LEVEL 10 LYMPH NODE Tissue LYMPH NODE PULMONARY (SPECIFY SITE) SURGICAL PATHOLOGY EXAM Florencio Morrow MD 5/13/2025 0844   4 : PERICARDIAL IMPLANT Tissue SOFT TISSUE BIOPSY SURGICAL PATHOLOGY EXAM Florencio Morrow MD 5/13/2025 0849   5 : LEVEL 5 LYMPH NODE Tissue LYMPH NODE PULMONARY (SPECIFY SITE) SURGICAL PATHOLOGY EXAM Florencio Morrow MD 5/13/2025 0856   6 : LEVEL 11L LYMPH NODE Tissue LYMPH NODE PULMONARY (SPECIFY SITE) SURGICAL PATHOLOGY EXAM Florencio Morrow MD 5/13/2025 0856   7 : LEVEL 12L LYMPH NODE Tissue LYMPH NODE PULMONARY (SPECIFY SITE) SURGICAL PATHOLOGY EXAM Florencio Morrow MD 5/13/2025 0858   8 : LEVEL 11L SUMP LYMPH NODE Tissue LYMPH NODE PULMONARY (SPECIFY SITE) SURGICAL PATHOLOGY EXAM Florencio Morrow MD 5/13/2025 0913   9 : LEFT LOWER LOBECTOMY Tissue LUNG LOBECTOMY/SEGMENTECTOMY LEFT (SPECIFY SITE) SURGICAL PATHOLOGY EXAM Florencio Morrow MD 5/13/2025 0951      Findings: Pericardial implant negative for malignancy. See Dr. Morrow's note for additional details.       Complications:  None; patient tolerated the procedure well.     Disposition: PACU - hemodynamically stable.  Condition: stable      I was the first assist for the robotic assisted LLL lobectomy and MLND.     Marianna Valenzuela PA-C

## 2025-05-13 NOTE — ANESTHESIA PROCEDURE NOTES
Airway  Date/Time: 5/13/2025 7:37 AM  Reason: elective    Airway not difficult    Staffing  Performed: resident   Authorized by: Maynor Meyers MD    Performed by: Brigitte Padron DO  Patient location during procedure: OR    Patient Condition  Indications for airway management: anesthesia  Patient position: sniffing  Planned trial extubation  Sedation level: deep     Final Airway Details   Preoxygenated: yes  Final airway type: endotracheal airway  Successful airway: ETT - double lumen left  Cuffed: yes   Successful intubation technique: flexible bronchoscopy  Endotracheal tube insertion site: oral  ETT DL size (fr): 37  Placement verified by: bronchoscopy, capnometry and single lung ventilation   Measured from: teeth  ETT to teeth (cm): 30  Number of attempts at approach: 1    Additional Comments  Easy mask no oral airway used  Grade 1 view of cords with flexible bronchoscope  37 Fr Left ALECIA guided over flex bronch into place. Bronchial cuff inflated under bronch visualization

## 2025-05-13 NOTE — ANESTHESIA POSTPROCEDURE EVALUATION
Patient: Lilly Nina    Procedure Summary       Date: 05/13/25 Room / Location: [facility] OR 14 / [facility] OR    Anesthesia Start: 0717 Anesthesia Stop: 1022    Procedure: Robotic assisted LLL lobectomy, MLND (Left: Chest) Diagnosis:       NSCLC of left lung (Multi)      (NSCLC of left lung (Multi) [C34.92])    Surgeons: Florencio Morrow MD Responsible Provider: Av Martinez MD PhD    Anesthesia Type: general ASA Status: 3            Anesthesia Type: general    Vitals Value Taken Time   /65 05/13/25 10:15   Temp 36.0 05/13/25 10:22   Pulse 74 05/13/25 10:17   Resp 17 05/13/25 10:17   SpO2 100 % 05/13/25 10:17   Vitals shown include unfiled device data.    Anesthesia Post Evaluation    Patient location during evaluation: PACU  Patient participation: complete - patient participated  Level of consciousness: sleepy but conscious  Pain management: adequate  Multimodal analgesia pain management approach  Airway patency: patent  Two or more strategies used to mitigate risk of obstructive sleep apnea  Cardiovascular status: acceptable and blood pressure returned to baseline  Respiratory status: acceptable  Hydration status: acceptable  Postoperative Nausea and Vomiting: none      There were no known notable events for this encounter.

## 2025-05-13 NOTE — ANESTHESIA PROCEDURE NOTES
Peripheral IV  Date/Time: 5/13/2025 7:52 AM      Placement  Needle size: 16 G  Laterality: left  Location: forearm  Local anesthetic: none  Site prep: chlorhexidine  Technique: anatomical landmarks  Attempts: 1

## 2025-05-13 NOTE — OP NOTE
Date: 2025  OR Location: [facility] OR    Name: Lilly Nina, : 1960, Age: 65 y.o., MRN: [id number], Sex: female    Preop Diagnosis: NSCLC  Postop Diagnosis: NSCLC    Procedures:  Robotic assisted LLL lobectomy  Robotic assisted mediastinal lymph node dissection  Intercostal nerve block  Bronchoscopy    Surgeons:  Florencio Morrow MD    Resident/Fellow/Other Assistant:  Surgeons and Role:     * Karlene Vilchis MD - Resident - Assisting     * Marianna Valenzuela PA-C - BETTYE First Assist    Anesthesia: General  ASA: III  Anesthesia Staff: Anesthesiologist: Maynor Meyers MD  Anesthesia Resident: Brigitte Padron DO  Estimated Blood Loss: 5mL  Intra-op Medications:   Administrations occurring from 0645 to 1100 on 25:   Medication Name Total Dose   BUPivacaine-EPINEPHrine (Marcaine w/EPI) 0.25 %-1:200,000 injection 40 mL   acetaminophen (Ofirmev) injection 1,000 mg   ceFAZolin (Ancef) vial 1 g 2 g   dexAMETHasone (Decadron) 4 mg/mL 4 mg   ePHEDrine injection 5 mg   ePHEDrine injection 25 mg   fentaNYL (Sublimaze) injection 50 mcg/mL 100 mcg   ketamine injection 50 mg/ 5 mL (10 mg/mL) 60 mg   ketorolac (Toradol) injection 30 mg 15 mg   LR bolus Cannot be calculated   lidocaine (Xylocaine) injection 2 % 100 mg   methadone (Dolophine) injection 10 mg   midazolam PF (Versed) injection 1 mg/mL 2 mg   ondansetron 2 mg/mL 4 mg   phenylephrine 40 mcg/mL syringe 10 mL 400 mcg   propofol (Diprivan) injection 10 mg/mL 200 mg   rocuronium (ZeMuron) 50 mg/5 mL injection 60 mg   heparin (porcine) injection 5,000 Units 5,000 Units            Anesthesia Record               Intraprocedure I/O Totals          Intake    LR bolus 350.00 mL    acetaminophen (Ofirmev) injection 100.00 mL    Total Intake 450 mL       Output    Urine 105 mL    Est. Blood Loss 25 mL    Total Output 130 mL       Net    Net Volume 320 mL          Specimen:   ID Type Source Tests Collected by Time   1 : LEVEL 9 LYMPH NODE Tissue LYMPH NODE  PULMONARY (SPECIFY SITE) SURGICAL PATHOLOGY EXAM Florencio Morrow MD 5/13/2025 0839   2 : LEVEL 7 LYMPH NODE Tissue LYMPH NODE PULMONARY (SPECIFY SITE) SURGICAL PATHOLOGY EXAM Florencio Morrow MD 5/13/2025 0844   3 : LEVEL 10 LYMPH NODE Tissue LYMPH NODE PULMONARY (SPECIFY SITE) SURGICAL PATHOLOGY EXAM Florencio Morrow MD 5/13/2025 0844   4 : PERICARDIAL IMPLANT Tissue SOFT TISSUE BIOPSY SURGICAL PATHOLOGY EXAM Florencio Morrow MD 5/13/2025 0849   5 : LEVEL 5 LYMPH NODE Tissue LYMPH NODE PULMONARY (SPECIFY SITE) SURGICAL PATHOLOGY EXAM Florencio Morrow MD 5/13/2025 0856   6 : LEVEL 11L LYMPH NODE Tissue LYMPH NODE PULMONARY (SPECIFY SITE) SURGICAL PATHOLOGY EXAM Florencio Morrow MD 5/13/2025 0856   7 : LEVEL 12L LYMPH NODE Tissue LYMPH NODE PULMONARY (SPECIFY SITE) SURGICAL PATHOLOGY EXAM Florencio Morrow MD 5/13/2025 0858   8 : LEVEL 11L SUMP LYMPH NODE Tissue LYMPH NODE PULMONARY (SPECIFY SITE) SURGICAL PATHOLOGY EXAM Florencio Morrow MD 5/13/2025 0913   9 : LEFT LOWER LOBECTOMY Tissue LUNG LOBECTOMY/SEGMENTECTOMY LEFT (SPECIFY SITE) SURGICAL PATHOLOGY EXAM Florencio Morrow MD 5/13/2025 0951        Staff:   Circulator: Yanet Conner RN  Scrub Person: Leigha Mac RN    Findings: Left lower lobe known cancer complete resected without pleural implant or invasion noted.  There is an area of pericardial thickening which was biopsied and frozen section show inflammation without malignancy.    Indication:  This is a 65-year-old female with past med history of former smoker, hypertension hyperlipidemia who presented with a left lower lobe lung mass.  Staging workup include PET/CT, EBUS and the brain MRI showed no distant or mediastinal involvement.  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  She has clinical stage cT2a N0 stage Ib lung cancer.  I offer her minimally invasive left upper lobe lobectomy for curative intent.  Her cardiopulmonary function is adequate for resection.  The alternative is SBRT.  The risk of  surgery includes bleeding, infection, air leak, DVT, PE, arrhythmia, and postop pain.  The patient consented to proceed with surgery.    The patient was seen in the preoperative area. The risks, benefits, complications, treatment options, non-operative alternatives, expected recovery and outcomes were discussed with the patient. The possibilities of reaction to medication, pulmonary aspiration, injury to surrounding structures, bleeding, recurrent infection, the need for additional procedures, failure to diagnose a condition, and creating a complication requiring transfusion or operation were discussed with the patient. The patient concurred with the proposed plan, giving informed consent.  The site of surgery was properly noted/marked if necessary per policy. The patient has been actively warmed in preoperative area. Preoperative antibiotics have been ordered and given within 1 hours of incision. Venous thrombosis prophylaxis have been ordered including bilateral sequential compression devices and chemical prophylaxis.     Operation Details:  Patient was brought to operation room. A time-out was performed. Patient name, MRN and procedure were confirmed and all staff were in agreement. Patient received subcutaneous heparin. SCDs were placed and working. Ancef was given prior to incision. Patient was induced and intubated with a double lumen tube without issue. I then performed bronchoscopy through the double lumen tube. The bronchoscopy examination was normal, no endobronchial pathology seen. A Marcelino bladder catheter was placed with clear urine. Then we turned the patient to right decubitus position. All pressure points are padded. Double lumen was confirmed again in correct position. The chest was prepped and draped in sterile fashion. A pre-incision time out was performed. All staff are in agreement to proceed.     I placed my 8 mm camera port in the eighth intercostal space anteriorly.  The chest was entered  through a direct cutdown technique.  I then performed intercostal nerve block with quarter percent Marcaine 3 cc per intercostal space from the fourth-10th.  I placed one 8mm and one 12mm port posteriorly in the same interspace and then I placed one 12 mm port in the seventh intercostal space anteriorly.  My assistant port is at 10th intercostal space.  The air seal was used and the patient tolerated well. The robotic was then docked and instruments were introduced into the chest under direct visual guidance. I then scrubbed out to the robot console.     I started by taking down the inferior pulmonary ligament and harvested station 9 lymph nodes.  I then took down the pleura posteriorly towards the subcarinal space  I then dissected in the subcrinal space to harvested the entire station 7 lymph node packets.  I identified the ongoing PA and I harvest 10 L lymph node by the ongoing PA.  I then turned my attention to the 11L node between the PA and airway. I harvested the 11L node without issue.  I ensured hemostasis of the lymph node harvesting bed. I then turned to the anterior hilum. I harvest station 5 lymph node at PA window.  There was an area on the pericardium adjacent to the known left lower lobe tumor which showed thickening.  This did not appear to be an implant but I biopsied a piece of this thickened pericardium and sent for frozen section.  The frozen section came back as inflammatory cell without malignancy.  Then I decided to proceed with lobectomy.    I then turned my attention to the fissure. I entered the fissure at the plane above the main PA.  The posterior fissure is complete.  I cleared off the areolar tissue above the main PA. I harvest 12 L lymph nodes right between the PA branches.  I identified the ongoing PA and superior segment artery.  I also identified the lingular artery.  I then dissected along the ongoing PA anteriorly to identify the 11L sump lymph nodes sitting between the left  lower lobe and left upper lobe bronchus.  This lymph node was excised in its entirely.  I then was able to pass a vessel loop anterior to the PA encircling the rest of the anterior fissure.  I completed the anterior fissure with the 1 fire of the blue load stapler.  Now I was able to get around the ongoing PA and superior segment PA.  I took them with a white load stapler. Then I was able to then encircle inferior pulmonary vein and took it with another white load of the stapler.  I cleaned up lymphatic tissues off the bronchus.  I then encircled the lower lobe bronchus with green load stapler and clamped on it.  I performed insufflation test and see the left upper lobe insufflate without issue.  I then completed the lobectomy by taking the left lower lobe bronchus.  The specimen was removed from the chest with the Endo Catch bag.  I ensured hemostasis of the port sites.  I used pro gel to spray at the left upper lobe fissure dissection site to reduce airleak.  Then I removed all instruments and ports under visual guidance. I undocked the robot.  I scrubbed back into the bedside.  We confirmed hemostasis and the rest of the lung was in the correct orientation.  I left an 28 Mosotho chest tube posteriorly.  Then the lung was reinflated without issue under visualization.  All the ports were closed with 2-0 Vicryl and 3-0 Monocryl.  The final counts were correct.  Patient was waken from general anesthesia and brought to recovery area in stable condition.    I was present for the entire duration of the procedure.    Marianna Valenzuela is required at bedside as first assist for robotic portion of the case including docking, instrumentation, de-docking, troubleshooting, dissection, and specimen retrieval. Since there is no qualified resident to assist at bedside, her role is critical to ensure the safety of the complex operation.     Florencio Morrow MD  Thoracic Surgeon  [facility]   [facility]   of Medicine  [facility]  Office phone: [phone]  Fax: [phone]  Pager: [phone]

## 2025-05-13 NOTE — ANESTHESIA PROCEDURE NOTES
Peripheral IV  Date/Time: 5/13/2025 7:40 AM      Placement  Needle size: 16 G  Laterality: right  Location: hand  Local anesthetic: none  Site prep: chlorhexidine  Technique: anatomical landmarks  Attempts: 1

## 2025-05-14 ENCOUNTER — APPOINTMENT (OUTPATIENT)
Dept: CARDIOLOGY | Facility: HOSPITAL | Age: 65
DRG: 164 | End: 2025-05-14
Payer: COMMERCIAL

## 2025-05-14 ENCOUNTER — APPOINTMENT (OUTPATIENT)
Dept: RADIOLOGY | Facility: HOSPITAL | Age: 65
DRG: 164 | End: 2025-05-14
Payer: COMMERCIAL

## 2025-05-14 LAB
ACID FAST STN SPEC: NORMAL
ANION GAP SERPL CALC-SCNC: 10 MMOL/L (ref 10–20)
BUN SERPL-MCNC: 12 MG/DL (ref 6–23)
CALCIUM SERPL-MCNC: 9 MG/DL (ref 8.6–10.6)
CHLORIDE SERPL-SCNC: 98 MMOL/L (ref 98–107)
CO2 SERPL-SCNC: 30 MMOL/L (ref 21–32)
CREAT SERPL-MCNC: 0.73 MG/DL (ref 0.5–1.05)
EGFRCR SERPLBLD CKD-EPI 2021: 90 ML/MIN/1.73M*2
ERYTHROCYTE [DISTWIDTH] IN BLOOD BY AUTOMATED COUNT: 13.1 % (ref 11.5–14.5)
GLUCOSE SERPL-MCNC: 124 MG/DL (ref 74–99)
HCT VFR BLD AUTO: 36.9 % (ref 36–46)
HGB BLD-MCNC: 12.1 G/DL (ref 12–16)
MAGNESIUM SERPL-MCNC: 1.9 MG/DL (ref 1.6–2.4)
MCH RBC QN AUTO: 29.8 PG (ref 26–34)
MCHC RBC AUTO-ENTMCNC: 32.8 G/DL (ref 32–36)
MCV RBC AUTO: 91 FL (ref 80–100)
MYCOBACTERIUM SPEC CULT: NORMAL
NRBC BLD-RTO: 0 /100 WBCS (ref 0–0)
PLATELET # BLD AUTO: 302 X10*3/UL (ref 150–450)
POTASSIUM SERPL-SCNC: 4.3 MMOL/L (ref 3.5–5.3)
RBC # BLD AUTO: 4.06 X10*6/UL (ref 4–5.2)
SODIUM SERPL-SCNC: 134 MMOL/L (ref 136–145)
WBC # BLD AUTO: 11.5 X10*3/UL (ref 4.4–11.3)

## 2025-05-14 PROCEDURE — 2500000004 HC RX 250 GENERAL PHARMACY W/ HCPCS (ALT 636 FOR OP/ED): Performed by: PHYSICIAN ASSISTANT

## 2025-05-14 PROCEDURE — S4991 NICOTINE PATCH NONLEGEND: HCPCS | Performed by: PHYSICIAN ASSISTANT

## 2025-05-14 PROCEDURE — 71045 X-RAY EXAM CHEST 1 VIEW: CPT

## 2025-05-14 PROCEDURE — 71045 X-RAY EXAM CHEST 1 VIEW: CPT | Performed by: RADIOLOGY

## 2025-05-14 PROCEDURE — 2500000001 HC RX 250 WO HCPCS SELF ADMINISTERED DRUGS (ALT 637 FOR MEDICARE OP): Performed by: PHYSICIAN ASSISTANT

## 2025-05-14 PROCEDURE — 83735 ASSAY OF MAGNESIUM: CPT | Performed by: PHYSICIAN ASSISTANT

## 2025-05-14 PROCEDURE — 1200000002 HC GENERAL ROOM WITH TELEMETRY DAILY

## 2025-05-14 PROCEDURE — 36415 COLL VENOUS BLD VENIPUNCTURE: CPT | Performed by: PHYSICIAN ASSISTANT

## 2025-05-14 PROCEDURE — 85027 COMPLETE CBC AUTOMATED: CPT | Performed by: PHYSICIAN ASSISTANT

## 2025-05-14 PROCEDURE — 82310 ASSAY OF CALCIUM: CPT | Performed by: PHYSICIAN ASSISTANT

## 2025-05-14 PROCEDURE — 2500000004 HC RX 250 GENERAL PHARMACY W/ HCPCS (ALT 636 FOR OP/ED): Performed by: NURSE PRACTITIONER

## 2025-05-14 PROCEDURE — 93005 ELECTROCARDIOGRAM TRACING: CPT

## 2025-05-14 PROCEDURE — 2500000001 HC RX 250 WO HCPCS SELF ADMINISTERED DRUGS (ALT 637 FOR MEDICARE OP): Performed by: NURSE PRACTITIONER

## 2025-05-14 PROCEDURE — 97161 PT EVAL LOW COMPLEX 20 MIN: CPT | Mod: GP

## 2025-05-14 PROCEDURE — 82565 ASSAY OF CREATININE: CPT | Performed by: PHYSICIAN ASSISTANT

## 2025-05-14 PROCEDURE — 2500000002 HC RX 250 W HCPCS SELF ADMINISTERED DRUGS (ALT 637 FOR MEDICARE OP, ALT 636 FOR OP/ED): Performed by: PHYSICIAN ASSISTANT

## 2025-05-14 RX ORDER — METOCLOPRAMIDE 10 MG/1
10 TABLET ORAL ONCE
Status: COMPLETED | OUTPATIENT
Start: 2025-05-14 | End: 2025-05-14

## 2025-05-14 RX ORDER — TRAMADOL HYDROCHLORIDE 50 MG/1
100 TABLET, FILM COATED ORAL EVERY 6 HOURS PRN
Status: DISCONTINUED | OUTPATIENT
Start: 2025-05-14 | End: 2025-05-15 | Stop reason: HOSPADM

## 2025-05-14 RX ORDER — TRAMADOL HYDROCHLORIDE 50 MG/1
50 TABLET, FILM COATED ORAL EVERY 6 HOURS PRN
Status: DISCONTINUED | OUTPATIENT
Start: 2025-05-14 | End: 2025-05-15 | Stop reason: HOSPADM

## 2025-05-14 RX ORDER — LANOLIN ALCOHOL/MO/W.PET/CERES
400 CREAM (GRAM) TOPICAL ONCE
Status: COMPLETED | OUTPATIENT
Start: 2025-05-14 | End: 2025-05-14

## 2025-05-14 RX ORDER — IBUPROFEN 600 MG/1
600 TABLET, FILM COATED ORAL EVERY 6 HOURS SCHEDULED
Status: DISCONTINUED | OUTPATIENT
Start: 2025-05-14 | End: 2025-05-15 | Stop reason: HOSPADM

## 2025-05-14 RX ADMIN — METOCLOPRAMIDE 10 MG: 10 TABLET ORAL at 09:49

## 2025-05-14 RX ADMIN — OXYCODONE HYDROCHLORIDE 10 MG: 10 TABLET ORAL at 05:13

## 2025-05-14 RX ADMIN — NICOTINE 1 PATCH: 21 PATCH, EXTENDED RELEASE TRANSDERMAL at 22:18

## 2025-05-14 RX ADMIN — ACETAMINOPHEN 650 MG: 325 TABLET ORAL at 21:26

## 2025-05-14 RX ADMIN — METOPROLOL TARTRATE 12.5 MG: 25 TABLET, FILM COATED ORAL at 14:08

## 2025-05-14 RX ADMIN — ATORVASTATIN CALCIUM 20 MG: 20 TABLET, FILM COATED ORAL at 08:25

## 2025-05-14 RX ADMIN — HEPARIN SODIUM 5000 UNITS: 5000 INJECTION, SOLUTION INTRAVENOUS; SUBCUTANEOUS at 21:26

## 2025-05-14 RX ADMIN — METOPROLOL TARTRATE 12.5 MG: 25 TABLET, FILM COATED ORAL at 05:12

## 2025-05-14 RX ADMIN — SENNOSIDES AND DOCUSATE SODIUM 2 TABLET: 50; 8.6 TABLET ORAL at 21:26

## 2025-05-14 RX ADMIN — ACETAMINOPHEN 650 MG: 325 TABLET ORAL at 15:43

## 2025-05-14 RX ADMIN — MAGNESIUM OXIDE TAB 400 MG (241.3 MG ELEMENTAL MG) 400 MG: 400 (241.3 MG) TAB at 09:49

## 2025-05-14 RX ADMIN — ACETAMINOPHEN 650 MG: 325 TABLET ORAL at 04:57

## 2025-05-14 RX ADMIN — ACETAMINOPHEN 650 MG: 325 TABLET ORAL at 09:49

## 2025-05-14 RX ADMIN — HEPARIN SODIUM 5000 UNITS: 5000 INJECTION, SOLUTION INTRAVENOUS; SUBCUTANEOUS at 14:08

## 2025-05-14 RX ADMIN — SENNOSIDES AND DOCUSATE SODIUM 2 TABLET: 50; 8.6 TABLET ORAL at 08:25

## 2025-05-14 RX ADMIN — CEFAZOLIN SODIUM 2 G: 2 INJECTION, SOLUTION INTRAVENOUS at 05:21

## 2025-05-14 RX ADMIN — NICOTINE 1 PATCH: 21 PATCH, EXTENDED RELEASE TRANSDERMAL at 00:00

## 2025-05-14 RX ADMIN — METOPROLOL TARTRATE 12.5 MG: 25 TABLET, FILM COATED ORAL at 21:27

## 2025-05-14 RX ADMIN — IBUPROFEN 600 MG: 600 TABLET, FILM COATED ORAL at 12:36

## 2025-05-14 RX ADMIN — IBUPROFEN 600 MG: 600 TABLET, FILM COATED ORAL at 18:30

## 2025-05-14 RX ADMIN — HEPARIN SODIUM 5000 UNITS: 5000 INJECTION, SOLUTION INTRAVENOUS; SUBCUTANEOUS at 05:15

## 2025-05-14 RX ADMIN — TRAMADOL HYDROCHLORIDE 50 MG: 50 TABLET, COATED ORAL at 09:49

## 2025-05-14 ASSESSMENT — COGNITIVE AND FUNCTIONAL STATUS - GENERAL
MOBILITY SCORE: 19
MOBILITY SCORE: 23
MOVING TO AND FROM BED TO CHAIR: A LITTLE
DRESSING REGULAR LOWER BODY CLOTHING: A LITTLE
WALKING IN HOSPITAL ROOM: A LITTLE
CLIMB 3 TO 5 STEPS WITH RAILING: A LITTLE
WALKING IN HOSPITAL ROOM: A LITTLE
TURNING FROM BACK TO SIDE WHILE IN FLAT BAD: A LITTLE
HELP NEEDED FOR BATHING: A LITTLE
CLIMB 3 TO 5 STEPS WITH RAILING: A LITTLE
CLIMB 3 TO 5 STEPS WITH RAILING: A LITTLE
STANDING UP FROM CHAIR USING ARMS: A LITTLE
MOVING TO AND FROM BED TO CHAIR: A LITTLE
DAILY ACTIVITIY SCORE: 23
TOILETING: A LITTLE
STANDING UP FROM CHAIR USING ARMS: A LITTLE
MOBILITY SCORE: 18
TURNING FROM BACK TO SIDE WHILE IN FLAT BAD: A LITTLE
DRESSING REGULAR UPPER BODY CLOTHING: A LITTLE
MOVING FROM LYING ON BACK TO SITTING ON SIDE OF FLAT BED WITH BEDRAILS: A LITTLE
DRESSING REGULAR UPPER BODY CLOTHING: A LITTLE
DAILY ACTIVITIY SCORE: 20

## 2025-05-14 ASSESSMENT — ACTIVITIES OF DAILY LIVING (ADL): ADL_ASSISTANCE: INDEPENDENT

## 2025-05-14 ASSESSMENT — PAIN SCALES - GENERAL
PAINLEVEL_OUTOF10: 0 - NO PAIN
PAINLEVEL_OUTOF10: 0 - NO PAIN
PAINLEVEL_OUTOF10: 4

## 2025-05-14 ASSESSMENT — PAIN - FUNCTIONAL ASSESSMENT
PAIN_FUNCTIONAL_ASSESSMENT: 0-10

## 2025-05-14 ASSESSMENT — PAIN DESCRIPTION - LOCATION: LOCATION: CHEST

## 2025-05-14 ASSESSMENT — PAIN DESCRIPTION - DESCRIPTORS: DESCRIPTORS: ACHING

## 2025-05-14 ASSESSMENT — PAIN DESCRIPTION - ORIENTATION: ORIENTATION: LEFT

## 2025-05-14 NOTE — HOSPITAL COURSE
Ms. Lilly Nina is a 65-year-old female with past med history of former smoker, hypertension hyperlipidemia who presented with a left lower lobe lung mass.  Staging workup include PET/CT, EBUS and the brain MRI showed no distant or mediastinal involvement.  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  She has clinical stage cT2a N0 stage Ib lung cancer.    On 5/13/2025 she underwent, Bronchoscopy, Robotic Assisted Left Lower Lobectomy, Mediastinal Lymph Node Dissection, Intercostal Nerve Blocks.    Intra-op Findings: Left lower lobe known cancer complete resected without pleural implant or invasion noted.  There is an area of pericardial thickening which was biopsied and frozen section show inflammation without malignancy.

## 2025-05-14 NOTE — CARE PLAN
Problem: Fall/Injury  Goal: Not fall by end of shift  Outcome: Progressing     Problem: Pain - Adult  Goal: Verbalizes/displays adequate comfort level or baseline comfort level  Outcome: Progressing     Problem: Safety - Adult  Goal: Free from fall injury  Outcome: Progressing     Problem: Discharge Planning  Goal: Discharge to home or other facility with appropriate resources  Outcome: Progressing     Problem: Chronic Conditions and Co-morbidities  Goal: Patient's chronic conditions and co-morbidity symptoms are monitored and maintained or improved  Outcome: Progressing   The patient's goals for the shift include Get rest    The clinical goals for the shift include Promote safety and rest

## 2025-05-14 NOTE — PROGRESS NOTES
05/14/25 0950   Discharge Planning   Living Arrangements Children   Support Systems Children   Assistance Needed independent   Type of Residence Private residence   Number of Stairs to Enter Residence 0   Number of Stairs Within Residence 0   Do you have animals or pets at home? Yes   Type of Animals or Pets 2 dogs   Who is requesting discharge planning? Provider   Home or Post Acute Services None   Expected Discharge Disposition Home   Does the patient need discharge transport arranged? No     Met with patient and introduced myself as Care Coordinator and member of the discharge planning team.  Pt is s/p LLL lobectomy. She plans to return home at time of discharge with her daughter who is a nurse. Patient is independent in ADL's. Her PCP is Gayle Urbina NP. She uses [facility] in [place]. No home care needs were identified at this time. Care Coordinator will continue to follow for home going needs.

## 2025-05-14 NOTE — PROGRESS NOTES
Physical Therapy    Physical Therapy Evaluation    Patient Name: Lilly Nina  MRN: [id number]  Department: [place] [id number]  Room: [address] [place] [address]  Today's Date: 5/14/2025   Time Calculation  Start Time: 1045  Stop Time: 1105  Time Calculation (min): 20 min    Assessment/Plan   PT Assessment  PT Assessment Results: Decreased mobility  Rehab Prognosis: Excellent  Barriers to Discharge Home: No anticipated barriers  Evaluation/Treatment Tolerance: Other (Comment) (generally tolerated in-room mobility well, persistent nausea and lightheadness reported)  Medical Staff Made Aware: Yes (RN aware of bradycardia)  Strengths: Ability to acquire knowledge  End of Session Communication: Bedside nurse  Assessment Comment: 66 y/o F who presents with NSCLC of L lung, now s/p LLL lobectomy; patient demo's mild reduce mobility and would benefit from continued skilled PT services throughout hospital stay to progress functional mobility. Anticipate no further skilled PT needs post DC however  End of Session Patient Position: Bed, 3 rail up, Alarm off, not on at start of session  IP OR SWING BED PT PLAN  Inpatient or Swing Bed: Inpatient  PT Plan  Treatment/Interventions: Bed mobility, Transfer training, Gait training, Balance training, Strengthening, Therapeutic exercise, Therapeutic activity  PT Plan: Ongoing PT  PT Frequency: 3 times per week  PT Discharge Recommendations: No PT needed after discharge  Equipment Recommended upon Discharge:  (n/a)  PT Recommended Transfer Status: Stand by assist  PT - OK to Discharge: Yes (DC rec made)    Subjective     PT Visit Info:  PT Received On: 05/14/25  General Visit Information:  General  Reason for Referral: presents with left lower lobe lung mass; s/p bronchoscopy, L lower lobe lobectomy, mediastinal LN dissection  Past Medical History Relevant to Rehab: former smoker, hypertension hyperlipidemia; denied falls/6 months  Family/Caregiver Present: No  Prior to Session Communication: Bedside  nurse  Patient Position Received: Bed, 3 rail up, Alarm off, not on at start of session  General Comment: agreeable to participate in therapy, reports feeling nauseous this AM, RN provided patient with anti-nausea meds earlier; patient reports ambulating with the mobility aide earlier and feeling nauseous and lightheaded.  Home Living:  Home Living  Type of Home: House  Lives With: Alone (however, reports dtr (whose and RN) will be staying with her, + 3 young grandkids (2,3,6); reports good support of friends as well)  Home Adaptive Equipment: None  Home Layout: One level  Home Access: Ramped entrance  Bathroom Shower/Tub: Walk-in shower  Bathroom Toilet: Standard  Bathroom Equipment: Grab bars in shower (+ shower stool)  Prior Level of Function:  Prior Function Per Pt/Caregiver Report  Level of [place]: Independent with homemaking with ambulation, Independent with ADLs and functional transfers  ADL Assistance: Independent  Homemaking Assistance: Independent  Ambulatory Assistance: Independent (household and community)  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       Vital Signs Comment: sitting EOB: 98% on RA, HR in low 50s, 139/77 BP     Objective   Pain:  Pain Assessment  Pain Assessment: 0-10  0-10 (Numeric) Pain Score: 0 - No pain  Cognition:  Cognition  Overall Cognitive Status: Within Functional Limits  Orientation Level: Oriented X4  Following Commands: Follows all commands and directions without difficulty    General Assessments:        Activity Tolerance  Early Mobility/Exercise Safety Screen: Proceed with mobilization - No exclusion criteria met  Activity Tolerance Comments: reported mild persistent nausea and lightheadedness  with mobilty    Sensation  Light Touch: No apparent deficits    Strength  Strength Comments: BUEs/BLEs appears WFL  Coordination  Movements are Fluid and Coordinated: Yes    Postural Control  Postural Control: Within Functional Limits    Static Sitting Balance  Static Sitting-Balance Support: Feet supported, No upper extremity supported  Static Sitting-Level of Assistance: Independent  Dynamic Sitting Balance  Dynamic Sitting-Balance Support: Feet supported, No upper extremity supported  Dynamic Sitting-Level of Assistance: Distant supervision  Dynamic Sitting-Comments: x1    Static Standing Balance  Static Standing-Balance Support: No upper extremity supported  Static Standing-Level of Assistance: Distant supervision  Static Standing-Comment/Number of Minutes: x1  Dynamic Standing Balance  Dynamic Standing-Balance Support: No upper extremity supported  Dynamic Standing-Level of Assistance: Close supervision  Dynamic Standing-Comments: x1  Functional Assessments:  Bed Mobility  Bed Mobility: Yes  Bed Mobility 1  Bed Mobility 1: Supine to sitting, Sitting to supine  Level of Assistance 1: Distant supervision  Bed Mobility Comments 1: HOB elevated, use of bedrail for supine->sit task    Transfers  Transfer: Yes  Transfer 1  Transfer From 1: Sit to, Stand to  Transfer to 1: Sit, Stand  Technique 1: Sit to stand, Stand to sit  Transfer Level of Assistance 1: Close supervision  Trials/Comments 1: 2x STS from EOB, 1x STS from toilet in bathroom    Ambulation/Gait Training  Ambulation/Gait Training Performed: Yes  Ambulation/Gait Training 1  Surface 1: Level tile  Device 1: No device  Assistance 1: Close supervision  Comments/Distance (ft) 1: 1x8 ft, seated rest break d/t nausea/lightheadedness; after resolution of reported symptoms, patient then ambulated 2x10 ft to/from bathroom, no acute LOB  Extremity/Trunk Assessments:  RUE   RUE :  (AROM WFL)  LUE   LUE:  (AROM WFL)  RLE   RLE :  (AROM WFL)  LLE   LLE :  (AROM  WFL)  Outcome Measures:  [facility] Basic Mobility  Turning from your back to your side while in a flat bed without using bedrails: None  Moving from lying on your back to sitting on the side of a flat bed without using bedrails: A little  Moving to and from bed to chair (including a wheelchair): A little  Standing up from a chair using your arms (e.g. wheelchair or bedside chair): A little  To walk in hospital room: A little  Climbing 3-5 steps with railing: A little  Basic Mobility - Total Score: 19    Encounter Problems       Encounter Problems (Active)       PT Problem       Patient will complete bed mobility with independence        Start:  05/14/25    Expected End:  05/28/25            Patient will complete STS with independence using No Device without acute LOB         Start:  05/14/25    Expected End:  05/28/25            Patient will ambulate >/=300' with No Device with independence without acute LOB        Start:  05/14/25    Expected End:  05/28/25            Patient will score >25/28 on the Tinetti Mobility Outcome Assessment (combined gait and balance) in order to demonstrate low fall risk.        Start:  05/14/25    Expected End:  05/28/25            Patient will perform 5x sit to stand with </=11 seconds in order to demonstrate the average functional lower limb muscle strength for a community dwelling adult, without acute LOB and to demo low fall risk.        Start:  05/14/25    Expected End:  05/28/25               Pain - Adult              Education Documentation  Body Mechanics, taught by Vania Mark PT at 5/14/2025 11:26 AM.  Learner: Patient  Readiness: Acceptance  Method: Explanation  Response: Needs Reinforcement  Comment: mobility progression    Mobility Training, taught by Vania Mark PT at 5/14/2025 11:26 AM.  Learner: Patient  Readiness: Acceptance  Method: Explanation  Response: Needs Reinforcement  Comment: mobility progression    Education Comments  No comments found.        Vania ROBERTSON  [place], PT, DPT

## 2025-05-14 NOTE — PROGRESS NOTES
Thoracic Surgery Progress Note  5/14/2025    Ms. Lilly Nina is a 65-year-old female with past med history of former smoker, hypertension hyperlipidemia who presented with a left lower lobe lung mass.  Staging workup include PET/CT, EBUS and the brain MRI showed no distant or mediastinal involvement.  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  She has clinical stage cT2a N0 stage Ib lung cancer.    On 5/13/2025 she underwent, Bronchoscopy, Robotic Assisted Left Lower Lobectomy, Mediastinal Lymph Node Dissection, Intercostal Nerve Blocks.    Intra-op Findings: Left lower lobe known cancer complete resected without pleural implant or invasion noted.  There is an area of pericardial thickening which was biopsied and frozen section show inflammation without malignancy.    Overnight issues: NAEON. CT with minimal drg. No airleak.     Physical Exam:  General: She is a pleasant female currently in no distress.  Visit Vitals  /75   Pulse 52   Temp 35.7 °C (96.3 °F) (Temporal)   Resp 16   Wt 45.9 kg (101 lb 3.2 oz)   SpO2 98%   BMI 18.51 kg/m²   OB Status Postmenopausal   Smoking Status Every Day   BSA 1.42 m²     Body mass index is 18.51 kg/m².   HEENT: Normocephalic and atraumatic.   NECK: Supple. Trach midline. No JVD.   CHEST: Breathing comfortably on room air.  Chest tube without airleak, minimal sero sang drg. Removed on AM rounds. Occlusive dressing applied.  HEART: Regular rate and rhythm. NSR per tele review.   ABDOMEN: Soft, flat, nontender.   : voiding   NEUROLOGIC: Alert and oriented. Grossly intact.   EXTREMITIES: Moves all extremities equally.  Pedal pulses are palpable. No lower extremity edema. No calf tenderness.       Diagnostics:     Intake/Output Summary (Last 24 hours) at 5/14/2025 0936  Last data filed at 5/14/2025 0500  Gross per 24 hour   Intake 450 ml   Output 313 ml   Net 137 ml     Results from last 7 days   Lab Units 05/14/25  0629   WBC AUTO x10*3/uL 11.5*    HEMOGLOBIN g/dL 12.1   HEMATOCRIT % 36.9   PLATELETS AUTO x10*3/uL 302     Results from last 7 days   Lab Units 05/14/25  0629   SODIUM mmol/L 134*   POTASSIUM mmol/L 4.3   CHLORIDE mmol/L 98   CO2 mmol/L 30   BUN mg/dL 12   CREATININE mg/dL 0.73   GLUCOSE mg/dL 124*   CALCIUM mg/dL 9.0     Results from last 7 days   Lab Units 05/14/25  0629   SODIUM mmol/L 134*   POTASSIUM mmol/L 4.3   CHLORIDE mmol/L 98   CO2 mmol/L 30   BUN mg/dL 12   CREATININE mg/dL 0.73   GLUCOSE mg/dL 124*   CALCIUM mg/dL 9.0     Scheduled medications  Scheduled Medications[1]  Continuous medications  Continuous Medications[2]  PRN medications  PRN Medications[3]    Imaging:   AM and post pull CXR reviewed. Expected post op changes and chest tube placement. No clinically significant pneumothorax. No formal report at this time.  Post pull CXR with tiny apical-lateral space.     Assessment:  Lilly Nina is a 65 y.o. female status post Bronchoscopy, Robotic Assisted Left Lower Lobectomy, Mediastinal Lymph Node Dissection, Intercostal Nerve Blocks for a left lower lobe lung mass. Satisfactory post op course. CT removed pod #1, satisfactory post pull CXR.     Plan:  Neurology: post operative pain  -Begin oral regimen of pain control with scheduled Tylenol, patient prefers tramadol over oxycodone PRN   -Scheduled Motrin if kidney function permits  -Bowel regimen available for constipation secondary to pain medication   -Out of bed to the chair throughout the day  -Encourage ambulation as tolerated  - lidocaine patches   - trazodone per home reg    Cardiovascular:  hx HTN  -Continue telemetry  -Vital signs every four hours  -Continue metoprolol 12.5mg Q 8 hours for post operative arrhythmia prophylaxis   -Continue to hold home regimen of lisinopril  -Replace electrolytes as needed for K >4, Mg >2    Pulmonology: post op atelectasis, chest tube management, previous smoker   -Encourage incentive spirometer use every hour  -Continue  pulmonary hygiene  -Wean oxygen as tolerated   -CT discontinue 5/14  -Obtain daily CXR  -Monitor and record chest tube drainage every shift  - nicotine patches    Gastrointestinal:  hx HLD.   - regular diet as tolerated  - Zofran available for nausea  - scheduled colace, PRN bowel regimen  - reglan x 1 for large gastric bubble    Genitourinary:   -voiding  -Continue to monitor daily electrolytes with routine BMPs   -Adequate urine output    Infectious Disease:   -Continue to trend daily temperatures and WBC count to monitor for signs of post-operative infection   -Monitor surgical incisions for signs of infection   -Perioperative antibiotics completed     Hematology:   -Monitor for signs of acute blood loss  -Trend CBCs     Endocrine:  no issues    DVT Prophylaxis:   -Continue subcutaneous heparin and SCDs, early ambulation    Disposition:  -Plan for discharge to home once stable from a surgical standpoint. Patient prefers Thursday AM.   -Continue to assess for home-going needs     Patient seen and examined by this provider. Plan of care discussed with attending Dr. Morrow.    Cierra Allison, APRN-CNP  Department of Thoracic and Esophageal Surgery  Thoracic Surgery pager [phone]            [1] acetaminophen, 650 mg, oral, q6h  atorvastatin, 20 mg, oral, Daily  heparin (porcine), 5,000 Units, subcutaneous, q8h  lidocaine, 1 patch, transdermal, Daily  magnesium oxide, 400 mg, oral, Once  metoclopramide, 10 mg, oral, Once  metoprolol tartrate, 12.5 mg, oral, q8h THUAN  nicotine, 1 patch, transdermal, q24h  sennosides-docusate sodium, 2 tablet, oral, BID  [2]    [3] PRN medications: HYDROmorphone, ipratropium-albuteroL, naloxone, ondansetron, oxygen, traMADol, traMADol

## 2025-05-15 ENCOUNTER — APPOINTMENT (OUTPATIENT)
Dept: RADIOLOGY | Facility: HOSPITAL | Age: 65
DRG: 164 | End: 2025-05-15
Payer: COMMERCIAL

## 2025-05-15 VITALS
WEIGHT: 101.2 LBS | OXYGEN SATURATION: 98 % | SYSTOLIC BLOOD PRESSURE: 147 MMHG | BODY MASS INDEX: 18.51 KG/M2 | DIASTOLIC BLOOD PRESSURE: 78 MMHG | TEMPERATURE: 97.9 F | RESPIRATION RATE: 16 BRPM | HEART RATE: 54 BPM

## 2025-05-15 LAB
ANION GAP SERPL CALC-SCNC: 11 MMOL/L (ref 10–20)
BUN SERPL-MCNC: 11 MG/DL (ref 6–23)
CALCIUM SERPL-MCNC: 9.3 MG/DL (ref 8.6–10.6)
CHLORIDE SERPL-SCNC: 97 MMOL/L (ref 98–107)
CO2 SERPL-SCNC: 30 MMOL/L (ref 21–32)
CREAT SERPL-MCNC: 0.66 MG/DL (ref 0.5–1.05)
EGFRCR SERPLBLD CKD-EPI 2021: >90 ML/MIN/1.73M*2
ERYTHROCYTE [DISTWIDTH] IN BLOOD BY AUTOMATED COUNT: 13.2 % (ref 11.5–14.5)
GLUCOSE SERPL-MCNC: 100 MG/DL (ref 74–99)
HCT VFR BLD AUTO: 42.1 % (ref 36–46)
HGB BLD-MCNC: 13.4 G/DL (ref 12–16)
MAGNESIUM SERPL-MCNC: 1.76 MG/DL (ref 1.6–2.4)
MCH RBC QN AUTO: 29.6 PG (ref 26–34)
MCHC RBC AUTO-ENTMCNC: 31.8 G/DL (ref 32–36)
MCV RBC AUTO: 93 FL (ref 80–100)
NRBC BLD-RTO: 0 /100 WBCS (ref 0–0)
PLATELET # BLD AUTO: 318 X10*3/UL (ref 150–450)
POTASSIUM SERPL-SCNC: 4.5 MMOL/L (ref 3.5–5.3)
RBC # BLD AUTO: 4.52 X10*6/UL (ref 4–5.2)
SODIUM SERPL-SCNC: 133 MMOL/L (ref 136–145)
WBC # BLD AUTO: 8.4 X10*3/UL (ref 4.4–11.3)

## 2025-05-15 PROCEDURE — 2500000001 HC RX 250 WO HCPCS SELF ADMINISTERED DRUGS (ALT 637 FOR MEDICARE OP): Performed by: NURSE PRACTITIONER

## 2025-05-15 PROCEDURE — 80048 BASIC METABOLIC PNL TOTAL CA: CPT | Performed by: PHYSICIAN ASSISTANT

## 2025-05-15 PROCEDURE — 2500000001 HC RX 250 WO HCPCS SELF ADMINISTERED DRUGS (ALT 637 FOR MEDICARE OP): Performed by: PHYSICIAN ASSISTANT

## 2025-05-15 PROCEDURE — 2500000004 HC RX 250 GENERAL PHARMACY W/ HCPCS (ALT 636 FOR OP/ED): Performed by: NURSE PRACTITIONER

## 2025-05-15 PROCEDURE — 2500000004 HC RX 250 GENERAL PHARMACY W/ HCPCS (ALT 636 FOR OP/ED): Performed by: PHYSICIAN ASSISTANT

## 2025-05-15 PROCEDURE — 36415 COLL VENOUS BLD VENIPUNCTURE: CPT | Performed by: PHYSICIAN ASSISTANT

## 2025-05-15 PROCEDURE — 2500000005 HC RX 250 GENERAL PHARMACY W/O HCPCS: Performed by: PHYSICIAN ASSISTANT

## 2025-05-15 PROCEDURE — 71045 X-RAY EXAM CHEST 1 VIEW: CPT

## 2025-05-15 PROCEDURE — 85027 COMPLETE CBC AUTOMATED: CPT | Performed by: PHYSICIAN ASSISTANT

## 2025-05-15 PROCEDURE — 71045 X-RAY EXAM CHEST 1 VIEW: CPT | Performed by: RADIOLOGY

## 2025-05-15 PROCEDURE — 83735 ASSAY OF MAGNESIUM: CPT | Performed by: PHYSICIAN ASSISTANT

## 2025-05-15 RX ORDER — LIDOCAINE 560 MG/1
1 PATCH PERCUTANEOUS; TOPICAL; TRANSDERMAL DAILY
Start: 2025-05-15 | End: 2025-05-15 | Stop reason: HOSPADM

## 2025-05-15 RX ORDER — ACETAMINOPHEN 325 MG/1
650 TABLET ORAL EVERY 6 HOURS
Start: 2025-05-15

## 2025-05-15 RX ORDER — IBUPROFEN 600 MG/1
600 TABLET, FILM COATED ORAL EVERY 6 HOURS SCHEDULED
Qty: 30 TABLET | Refills: 0 | Status: SHIPPED | OUTPATIENT
Start: 2025-05-15

## 2025-05-15 RX ORDER — TRAMADOL HYDROCHLORIDE 50 MG/1
50 TABLET, FILM COATED ORAL EVERY 6 HOURS PRN
Qty: 20 TABLET | Refills: 0 | Status: SHIPPED | OUTPATIENT
Start: 2025-05-15

## 2025-05-15 RX ORDER — LANOLIN ALCOHOL/MO/W.PET/CERES
400 CREAM (GRAM) TOPICAL ONCE
Status: COMPLETED | OUTPATIENT
Start: 2025-05-15 | End: 2025-05-15

## 2025-05-15 RX ORDER — LIDOCAINE 50 MG/G
1 PATCH TOPICAL EVERY 12 HOURS
Qty: 10 PATCH | Refills: 0 | Status: SHIPPED | OUTPATIENT
Start: 2025-05-15

## 2025-05-15 RX ADMIN — IBUPROFEN 600 MG: 600 TABLET, FILM COATED ORAL at 06:28

## 2025-05-15 RX ADMIN — SENNOSIDES AND DOCUSATE SODIUM 2 TABLET: 50; 8.6 TABLET ORAL at 08:11

## 2025-05-15 RX ADMIN — IBUPROFEN 600 MG: 600 TABLET, FILM COATED ORAL at 00:10

## 2025-05-15 RX ADMIN — METOPROLOL TARTRATE 12.5 MG: 25 TABLET, FILM COATED ORAL at 06:28

## 2025-05-15 RX ADMIN — HEPARIN SODIUM 5000 UNITS: 5000 INJECTION, SOLUTION INTRAVENOUS; SUBCUTANEOUS at 06:29

## 2025-05-15 RX ADMIN — MAGNESIUM OXIDE TAB 400 MG (241.3 MG ELEMENTAL MG) 1 TABLET: 400 (241.3 MG) TAB at 09:40

## 2025-05-15 RX ADMIN — ATORVASTATIN CALCIUM 20 MG: 20 TABLET, FILM COATED ORAL at 08:11

## 2025-05-15 RX ADMIN — LIDOCAINE 1 PATCH: 4 PATCH TOPICAL at 08:12

## 2025-05-15 RX ADMIN — ACETAMINOPHEN 650 MG: 325 TABLET ORAL at 08:11

## 2025-05-15 RX ADMIN — ACETAMINOPHEN 650 MG: 325 TABLET ORAL at 04:12

## 2025-05-15 ASSESSMENT — COGNITIVE AND FUNCTIONAL STATUS - GENERAL
MOBILITY SCORE: 24
DAILY ACTIVITIY SCORE: 24

## 2025-05-15 ASSESSMENT — PAIN - FUNCTIONAL ASSESSMENT
PAIN_FUNCTIONAL_ASSESSMENT: 0-10

## 2025-05-15 ASSESSMENT — PAIN SCALES - GENERAL
PAINLEVEL_OUTOF10: 0 - NO PAIN

## 2025-05-15 NOTE — PROGRESS NOTES
Occupational Therapy                 Therapy Communication Note    Patient Name: Lilly Nina  MRN: [id number]  Department: [place] [address]  Room: [address] [place], [place] [address]  Today's Date: 5/15/2025     Discipline: Occupational Therapy    Missed Visit: OT Missed Visit: Yes     Missed Visit Reason: Missed Visit Reason: Other (Comment) (SCREEN- reporting baseline function demonstrating IND with functional mobility, transfers, balance, ADLs/simulated ADLs. Pt reports safe home setup, good social support, and good access to DME/AE. Pt functioning at baseline and has no skilled OT needs.)    Missed Time: Attempt    Comment: SCREEN [id number]

## 2025-05-15 NOTE — CARE PLAN
The patient's goals for the shift include adequate rest     Problem: Fall/Injury  Goal: Not fall by end of shift  Outcome: Progressing  Goal: Be free from injury by end of the shift  Outcome: Progressing  Goal: Verbalize understanding of personal risk factors for fall in the hospital  Outcome: Progressing  Goal: Verbalize understanding of risk factor reduction measures to prevent injury from fall in the home  Outcome: Progressing  Goal: Use assistive devices by end of the shift  Outcome: Progressing  Goal: Pace activities to prevent fatigue by end of the shift  Outcome: Progressing     Problem: Pain - Adult  Goal: Verbalizes/displays adequate comfort level or baseline comfort level  Ouatcome: Progressing     Problem: Safety - Adult  Goal: Free from fall injury  Outcome: Progressing     Problem: Discharge Planning  Goal: Discharge to home or other facility with appropriate resources  Outcome: Progressing     Problem: Chronic Conditions and Co-morbidities  Goal: Patient's chronic conditions and co-morbidity symptoms are monitored and maintained or improved  Outcome: Progressing     Problem: Nutrition  Goal: Nutrient intake appropriate for maintaining nutritional needs  Outcome: Progressing

## 2025-05-15 NOTE — NURSING NOTE
Discharge paperwork and education complete. Patient verbalized understanding with no questions at this time. Incision dressings clean, dry and intact. PIV removed without complications. Awaiting patient's ride at this time.

## 2025-05-15 NOTE — DISCHARGE SUMMARY
Discharge Diagnosis  NSCLC of left lung (Multi)    Issues Requiring Follow-Up  Final pathology     Test Results Pending At Discharge  Pending Labs       Order Current Status    Basic metabolic panel In process    CBC In process    Magnesium In process    Surgical Pathology Exam In process            Hospital Course  Ms. Lilly Nina is a 65-year-old female with past med history of former smoker, hypertension hyperlipidemia who presented with a left lower lobe lung mass.  Staging workup include PET/CT, EBUS and the brain MRI showed no distant or mediastinal involvement.  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  She has clinical stage cT2a N0 stage Ib lung cancer.    On 5/13/2025 she underwent, Bronchoscopy, Robotic Assisted Left Lower Lobectomy, Mediastinal Lymph Node Dissection, Intercostal Nerve Blocks.    Intra-op Findings: Left lower lobe known cancer complete resected without pleural implant or invasion noted.  There is an area of pericardial thickening which was biopsied and frozen section show inflammation without malignancy.    The patient has had an uncomplicated surgical course.  her chest tubes were removed on post operative day 1 and post pull imaging was without evidence of clinically significant  pneumothorax.     At the time of discharge, her pain was controlled on an oral pain regimen, She was breathing comfortably on room air.  Shewas ambulating independently.  She was tolerating a regular diet without nausea. She was voiding regularly.      The patient was educated on post operative activity restrictions, dietary guidelines, and pain management. She will follow up with Dr. Morrow in the outpatient setting in two weeks with a CXR just prior to this visit. The patient has been instructed to add an OTC stool softeners or laxative while taking oral analgesia.  Deep breathing, coughing, and walking at home encouraged. All questions have been answered and concerns addressed until  there were none.     OARRS Unintentional Overdose Risk Score:  070    Pertinent Physical Exam At Time of Discharge  General: She is a pleasant female currently in no distress, sitting at the side of the bed eating breakfast.   Visit Vitals  /80 (BP Location: Right arm, Patient Position: Lying)   Pulse 60   Temp 36.2 °C (97.2 °F) (Temporal)   Resp 16   Wt 45.9 kg (101 lb 3.2 oz)   SpO2 96%   BMI 18.51 kg/m²   OB Status Postmenopausal   Smoking Status Every Day   BSA 1.42 m²     Body mass index is 18.51 kg/m².   HEENT: Normocephalic and atraumatic.   NECK: Supple. Trach midline. No JVD.   CHEST: Breathing comfortably on room air.  Previous chest tube with new occlusive dressing applied.  HEART: Regular rate and rhythm. NSR per tele review.   ABDOMEN: Soft, flat, nontender.   : voiding   NEUROLOGIC: Alert and oriented. Grossly intact.   EXTREMITIES: Moves all extremities equally.  Pedal pulses are palpable. No lower extremity edema. No calf tenderness.     Home Medications     Medication List      START taking these medications     acetaminophen 325 mg tablet; Commonly known as: Tylenol; Take 2 tablets   (650 mg) by mouth every 6 hours.   ibuprofen 600 mg tablet; Take 1 tablet (600 mg) by mouth every 6 hours.   lidocaine 4 % patch; Place 1 patch over 12 hours on the skin once daily.   Remove & discard patch within 12 hours or as directed by MD.   traMADol 50 mg tablet; Commonly known as: Ultram; Take 1 tablet (50 mg)   by mouth every 6 hours if needed for severe pain (7 - 10).     CONTINUE taking these medications     ascorbic acid 1,000 mg tablet; Commonly known as: Vitamin C   atorvastatin 20 mg tablet; Commonly known as: Lipitor; Take 1 tablet (20   mg) by mouth once daily.   cholecalciferol 50 mcg (2,000 units) capsule; Commonly known as: Vitamin   D-3   lisinopril 5 mg tablet; TAKE 1 TABLET DAILY   nicotine 21 mg/24 hr patch; Commonly known as: Nicoderm CQ; Place 1   patch over 24 hours on the skin once  every 24 hours.   traZODone 100 mg tablet; Commonly known as: Desyrel; TAKE 1 TABLET AT   BEDTIME AS NEEDED FOR SLEEP   Unisom (doxylamine) 25 mg tablet; Generic drug: doxylamine   Vitamin B-12 1,000 mcg tablet; Generic drug: cyanocobalamin       Outpatient Follow-Up  Future Appointments   Date Time Provider Department Center   5/20/2025  9:30 AM Oscar Brock PA-C [id number] [facility]   5/28/2025  9:00 [id number]   6/20/2025 10:20 AM DO DAVIAN [id number] [facility]     Time spent with discharge was >30 minutes and included explanation of discharge instructions, arranging homegoing prescriptions, checking OARRS, supplies and follow-up, and creating the discharge summary of the patient's hospialization.    Cierra Allison, APRN-CNP

## 2025-05-20 ENCOUNTER — OFFICE VISIT (OUTPATIENT)
Dept: CARDIOLOGY | Facility: CLINIC | Age: 65
End: 2025-05-20
Payer: COMMERCIAL

## 2025-05-20 VITALS
BODY MASS INDEX: 19.03 KG/M2 | HEART RATE: 72 BPM | SYSTOLIC BLOOD PRESSURE: 144 MMHG | OXYGEN SATURATION: 98 % | DIASTOLIC BLOOD PRESSURE: 76 MMHG | WEIGHT: 103.4 LBS | HEIGHT: 62 IN

## 2025-05-20 DIAGNOSIS — I31.39 PERICARDIAL EFFUSION (HHS-HCC): Primary | ICD-10-CM

## 2025-05-20 DIAGNOSIS — I10 ESSENTIAL HYPERTENSION WITH GOAL BLOOD PRESSURE LESS THAN 130/85: ICD-10-CM

## 2025-05-20 DIAGNOSIS — I25.10 CORONARY ARTERY CALCIFICATION: ICD-10-CM

## 2025-05-20 DIAGNOSIS — R94.31 ABNORMAL EKG: ICD-10-CM

## 2025-05-20 PROBLEM — L70.9 ACNE: Status: RESOLVED | Noted: 2025-03-03 | Resolved: 2025-05-20

## 2025-05-20 PROBLEM — E73.9 LACTOSE INTOLERANCE: Status: RESOLVED | Noted: 2025-03-03 | Resolved: 2025-05-20

## 2025-05-20 PROCEDURE — 99214 OFFICE O/P EST MOD 30 MIN: CPT | Performed by: PHYSICIAN ASSISTANT

## 2025-05-20 PROCEDURE — 3008F BODY MASS INDEX DOCD: CPT | Performed by: PHYSICIAN ASSISTANT

## 2025-05-20 PROCEDURE — 1160F RVW MEDS BY RX/DR IN RCRD: CPT | Performed by: PHYSICIAN ASSISTANT

## 2025-05-20 PROCEDURE — 1111F DSCHRG MED/CURRENT MED MERGE: CPT | Performed by: PHYSICIAN ASSISTANT

## 2025-05-20 PROCEDURE — 3078F DIAST BP <80 MM HG: CPT | Performed by: PHYSICIAN ASSISTANT

## 2025-05-20 PROCEDURE — 1159F MED LIST DOCD IN RCRD: CPT | Performed by: PHYSICIAN ASSISTANT

## 2025-05-20 PROCEDURE — 3077F SYST BP >= 140 MM HG: CPT | Performed by: PHYSICIAN ASSISTANT

## 2025-05-20 PROCEDURE — 4004F PT TOBACCO SCREEN RCVD TLK: CPT | Performed by: PHYSICIAN ASSISTANT

## 2025-05-20 NOTE — PROGRESS NOTES
"Chief Complaint:   Follow-up     History Of Present Illness:    Lilly Nina is a 65 y.o. female who is s/p left lower lobe lobectomy in the setting of squamous cell carcinoma.  Patient is recovering well without significant postoperative complications.  There was concern for possible pericardial effusion preoperatively, however this was ruled out on 2D echo which displayed normal LVEF 72% without evidence of significant effusion nor valvulopathy.  Patient denies chest pain, chest pressure, palpitations, dyspnea on exertion, shortness of breath at rest, diaphoresis, nausea/vomiting, back pain, headache, lightheadedness, dizziness, syncope or presyncopal episodes, active bleeding signs or symptoms, excessive weight gain, muscle or joint pain, claudication.       Last Recorded Vitals:  Vitals:    05/20/25 0915   BP: 144/76   BP Location: Left arm   Patient Position: Sitting   BP Cuff Size: Adult   Pulse: 72   SpO2: 98%   Weight: 46.9 kg (103 lb 6.4 oz)   Height: 1.575 m (5' 2\")       Past Medical History:  She has a past medical history of Encounter for screening for other disorder (01/12/2022).    Past Surgical History:  She has a past surgical history that includes Other surgical history (09/09/2021); Other surgical history (09/09/2021); Other surgical history (09/09/2021); and Other surgical history (09/09/2021).      Social History:  She reports that she has been smoking cigarettes. She started smoking about 51 years ago. She has a 51.3 pack-year smoking history. She has been exposed to tobacco smoke. She has never used smokeless tobacco. She reports that she does not currently use alcohol. She reports that she does not use drugs.    Family History:  Family History[1]     Allergies:  Patient has no known allergies.    Outpatient Medications:  Current Outpatient Medications   Medication Instructions    acetaminophen (TYLENOL) 650 mg, oral, Every 6 hours    ascorbic acid (Vitamin C) 1,000 mg tablet 1 tablet, " "Daily    atorvastatin (LIPITOR) 20 mg, oral, Daily    cholecalciferol (Vitamin D-3) 50 mcg (2,000 unit) capsule 1 capsule, Daily    cyanocobalamin (VITAMIN B-12) 1,000 mcg, Daily    doxylamine (Unisom, doxylamine,) 25 mg tablet 1 tablet, Nightly    ibuprofen 600 mg, oral, Every 6 hours scheduled    lidocaine (Lidoderm) 5 % patch 1 patch, transdermal, Every 12 hours, Remove & discard patch within 12 hours or as directed by MD.    lisinopril 5 mg, oral, Daily    nicotine (Nicoderm CQ) 21 mg/24 hr patch 1 patch, transdermal, Every 24 hours    traMADol (ULTRAM) 50 mg, oral, Every 6 hours PRN    traZODone (DESYREL) 100 mg, oral, Nightly PRN       Physical Exam:  Constitutional: awake and alert, oriented ×3, no apparent distress  Skin: warm, dry, good turgor no obvious lesions  Eyes: pupils equal, round, reactive to light, conjunctiva pink and noninjected, no discharge  HENT: normocephalic and atraumatic, mucous membranes moist, trachea midline with no masses/goiter  Cardiovascular: S1/S2 regular, no murmur no rubs/gallops, no carotid bruits, no JVD  Pulmonary: symmetrical chest expansion, lungs are clear to auscultation bilaterally, no wheezes/rales/rhonchi, normal effort  Abdomen: nontender, nondistended, active bowel sounds, no ascites  Extremities: no cyanosis, clubbing, no LE edema no lesions; palpable pedal pulses  Neurologic: cranial nerves II - XII grossly intact, stable gait, no tremor       Last Labs:  CBC -  Lab Results   Component Value Date    WBC 8.4 05/15/2025    WBC 8.9 05/06/2025    HGB 13.4 05/15/2025    HGB 13.0 05/06/2025    HCT 42.1 05/15/2025    HCT 39.2 05/06/2025    MCV 93 05/15/2025    MCV 90.5 05/06/2025     05/15/2025     05/06/2025       CMP -  Lab Results   Component Value Date    CALCIUM 9.3 05/15/2025    CALCIUM 9.2 05/06/2025       LIPID PANEL -   No results found for: \"CHOL\", \"TRIG\", \"HDL\", \"CHHDL\", \"LDLF\", \"VLDL\", \"NHDL\"    RENAL FUNCTION PANEL -   Lab Results   Component " Value Date    GLUCOSE 100 (H) 05/15/2025    GLUCOSE 70 05/06/2025     (L) 05/15/2025     05/06/2025    K 4.5 05/15/2025    K 4.4 05/06/2025    CL 97 (L) 05/15/2025     05/06/2025    CO2 30 05/15/2025    CO2 27 05/06/2025    ANIONGAP 11 05/15/2025    ANIONGAP 8 05/06/2025    BUN 11 05/15/2025    BUN 11 05/06/2025    CREATININE 0.66 05/15/2025    CREATININE 0.63 05/06/2025    CALCIUM 9.3 05/15/2025    CALCIUM 9.2 05/06/2025        Lab Results   Component Value Date    HGBA1C 5.9 (H) 02/20/2025       Last Cardiology Tests:  ECG:  ECG 12 lead (Clinic Performed) 04/07/2025      Echo:  Transthoracic echo (TTE) complete 04/23/2025      Ejection Fractions:  EF   Date/Time Value Ref Range Status   04/23/2025 11:22 AM 72 %        Cath:  No results found for this or any previous visit from the past 1095 days.      Stress Test:  No results found for this or any previous visit from the past 1095 days.      Cardiac Imaging:  No results found for this or any previous visit from the past 1095 days.      Assessment/Plan   Problem List Items Addressed This Visit           ICD-10-CM       Cardiac and Vasculature    Essential hypertension with goal blood pressure less than 130/85 I10    Pericardial effusion ([facility]) - Primary I31.39    Abnormal EKG R94.31    Coronary artery calcification I25.10       -No pericardial effusion noted on TTE    -RTC in 1 year to establish with Dr. Knight per patient preference/request      Oscar Brock PA-C         [1]   Family History  Problem Relation Name Age of Onset    Hypertension Mother      Hyperlipidemia Mother

## 2025-05-21 LAB
ACID FAST STN SPEC: NORMAL
MYCOBACTERIUM SPEC CULT: NORMAL

## 2025-05-28 ENCOUNTER — HOSPITAL ENCOUNTER (OUTPATIENT)
Dept: RADIOLOGY | Facility: HOSPITAL | Age: 65
Discharge: HOME | End: 2025-05-28
Payer: COMMERCIAL

## 2025-05-28 ENCOUNTER — OFFICE VISIT (OUTPATIENT)
Dept: SURGERY | Facility: HOSPITAL | Age: 65
End: 2025-05-28
Payer: COMMERCIAL

## 2025-05-28 VITALS
WEIGHT: 98.6 LBS | HEART RATE: 70 BPM | SYSTOLIC BLOOD PRESSURE: 115 MMHG | DIASTOLIC BLOOD PRESSURE: 59 MMHG | BODY MASS INDEX: 18.03 KG/M2 | TEMPERATURE: 98.2 F | OXYGEN SATURATION: 100 % | RESPIRATION RATE: 16 BRPM

## 2025-05-28 DIAGNOSIS — C34.92 NSCLC OF LEFT LUNG (MULTI): ICD-10-CM

## 2025-05-28 LAB
ACID FAST STN SPEC: NORMAL
LAB AP ASR DISCLAIMER: NORMAL
LAB AP BLOCK FOR ADDITIONAL STUDIES: NORMAL
LABORATORY COMMENT REPORT: NORMAL
Lab: NORMAL
MYCOBACTERIUM SPEC CULT: NORMAL
PATH REPORT.FINAL DX SPEC: NORMAL
PATH REPORT.GROSS SPEC: NORMAL
PATH REPORT.RELEVANT HX SPEC: NORMAL
PATH REPORT.TOTAL CANCER: NORMAL
PATHOLOGY SYNOPTIC REPORT: NORMAL

## 2025-05-28 PROCEDURE — 1126F AMNT PAIN NOTED NONE PRSNT: CPT | Performed by: STUDENT IN AN ORGANIZED HEALTH CARE EDUCATION/TRAINING PROGRAM

## 2025-05-28 PROCEDURE — 3074F SYST BP LT 130 MM HG: CPT | Performed by: STUDENT IN AN ORGANIZED HEALTH CARE EDUCATION/TRAINING PROGRAM

## 2025-05-28 PROCEDURE — 71046 X-RAY EXAM CHEST 2 VIEWS: CPT

## 2025-05-28 PROCEDURE — 1160F RVW MEDS BY RX/DR IN RCRD: CPT | Performed by: STUDENT IN AN ORGANIZED HEALTH CARE EDUCATION/TRAINING PROGRAM

## 2025-05-28 PROCEDURE — 3078F DIAST BP <80 MM HG: CPT | Performed by: STUDENT IN AN ORGANIZED HEALTH CARE EDUCATION/TRAINING PROGRAM

## 2025-05-28 PROCEDURE — 1111F DSCHRG MED/CURRENT MED MERGE: CPT | Performed by: STUDENT IN AN ORGANIZED HEALTH CARE EDUCATION/TRAINING PROGRAM

## 2025-05-28 PROCEDURE — 71046 X-RAY EXAM CHEST 2 VIEWS: CPT | Performed by: RADIOLOGY

## 2025-05-28 PROCEDURE — 1159F MED LIST DOCD IN RCRD: CPT | Performed by: STUDENT IN AN ORGANIZED HEALTH CARE EDUCATION/TRAINING PROGRAM

## 2025-05-28 PROCEDURE — 99211 OFF/OP EST MAY X REQ PHY/QHP: CPT | Mod: 25 | Performed by: STUDENT IN AN ORGANIZED HEALTH CARE EDUCATION/TRAINING PROGRAM

## 2025-05-28 RX ORDER — IBUPROFEN 200 MG
1 TABLET ORAL EVERY 24 HOURS
Qty: 14 PATCH | Refills: 0 | Status: SHIPPED | OUTPATIENT
Start: 2025-05-28 | End: 2025-06-11

## 2025-05-28 RX ORDER — NICOTINE 7MG/24HR
1 PATCH, TRANSDERMAL 24 HOURS TRANSDERMAL EVERY 24 HOURS
Qty: 14 PATCH | Refills: 0 | Status: SHIPPED | OUTPATIENT
Start: 2025-05-28 | End: 2025-06-11

## 2025-05-28 ASSESSMENT — PAIN SCALES - GENERAL: PAINLEVEL_OUTOF10: 0-NO PAIN

## 2025-05-28 NOTE — PROGRESS NOTES
Subjective   Lilly Nina  is a 65 y.o. female with a pmhx of HTN, HLD, current smoker and NSCLC who presents today for postop follow up. Patient underwent robotic assisted LLL lobectomy on 5/13/25. Did well postop. Hospital course was uncomplicated. She recovered well. She denied any SOB or WALKER. She has some numbness around the LUQ. She denied significant pain.     There is no significant change in patient's past medical history, past surgical history, social history, family history, or review of systems since last visit.     Objective   Visit Vitals  /59 (BP Location: Left arm, Patient Position: Sitting, BP Cuff Size: Small adult)   Pulse 70   Temp 36.8 °C (98.2 °F) (Temporal)   Resp 16     Physical Exam  Vitals reviewed.   Constitutional:       Appearance: Normal appearance.   HENT:      Head: Normocephalic and atraumatic.   Eyes:      General: No scleral icterus.     Extraocular Movements: Extraocular movements intact.      Pupils: Pupils are equal, round, and reactive to light.   Cardiovascular:      Rate and Rhythm: Normal rate and regular rhythm.      Heart sounds: No murmur heard.  Pulmonary:      Effort: Pulmonary effort is normal.      Breath sounds: Normal breath sounds. No wheezing.      Comments: Left chest incisions c/d/I  Abdominal:      General: Abdomen is flat. There is no distension.      Palpations: Abdomen is soft.      Tenderness: There is no abdominal tenderness. There is no guarding.   Musculoskeletal:         General: No swelling or tenderness. Normal range of motion.      Cervical back: Normal range of motion and neck supple.   Skin:     General: Skin is warm and dry.      Coloration: Skin is not jaundiced.   Neurological:      General: No focal deficit present.      Mental Status: She is alert and oriented to person, place, and time. Mental status is at baseline.   Psychiatric:         Mood and Affect: Mood normal.         Behavior: Behavior normal.         Diagnostic  Review  I have reviewed CXR 5/28/25. It showed clear lung field, no effusion or PTX.   I reviewed the operative note. It showed left lower lobe known cancer complete resected without pleural implant or invasion noted.  There is an area of pericardial thickening which was biopsied and frozen section show inflammation without malignancy.   Pathology report is still pending.      Assessment/Plan   Lilly Nina  is doing well. She recovered well. Surgical pathology report is still pending. I will call her to decide the next step of treatment. Otherwise I recommend follow up in 6 month with CT chest for routine cancer surveillance.     In term of her smoking, she has cut down to 5-6 cigarettes daily and she is using the patch. I will refer her to smoking cessation consultation.     Florencio Morrow MD  Thoracic Surgeon  [facility]   of Medicine  [facility]  Office phone: [phone]  Fax: [phone]  Pager: [phone]

## 2025-05-29 ENCOUNTER — TELEPHONE (OUTPATIENT)
Dept: CARDIOTHORACIC SURGERY | Facility: HOSPITAL | Age: 65
End: 2025-05-29
Payer: COMMERCIAL

## 2025-05-29 NOTE — TELEPHONE ENCOUNTER
Called patient today to discuss her surgical pathology report.  Left lower lobe specimen showed squamous cell carcinoma about 4 cm in size.  Margin negative.  All lymph nodes are negative.  Final staging pT2a N0 stage Ib.  I discussed with patient that I will refer her to see Dr. Michelle harvey from medical oncology to discuss the pros and cons of adjuvant therapy.  There was no strong evidence to support adjuvant therapy with stage Ib disease.  Otherwise I will see her back in 6 months with a surveillance CAT scan.  All question were answered and patient agreed to proceed.    Florencio Morrow MD  Thoracic Surgeon  [facility]   of Medicine  [facility]  Office phone: [phone]  Fax: [phone]

## 2025-05-30 ENCOUNTER — TELEPHONE (OUTPATIENT)
Dept: HEMATOLOGY/ONCOLOGY | Facility: HOSPITAL | Age: 65
End: 2025-05-30
Payer: COMMERCIAL

## 2025-06-04 ENCOUNTER — TELEMEDICINE (OUTPATIENT)
Dept: SURGERY | Facility: HOSPITAL | Age: 65
End: 2025-06-04
Payer: COMMERCIAL

## 2025-06-04 ENCOUNTER — PATIENT OUTREACH (OUTPATIENT)
Dept: HEMATOLOGY/ONCOLOGY | Facility: HOSPITAL | Age: 65
End: 2025-06-04

## 2025-06-04 DIAGNOSIS — F17.210 CIGARETTE NICOTINE DEPENDENCE WITHOUT COMPLICATION: Primary | ICD-10-CM

## 2025-06-04 DIAGNOSIS — C34.92 NSCLC OF LEFT LUNG (MULTI): ICD-10-CM

## 2025-06-04 DIAGNOSIS — F17.200 TOBACCO USE DISORDER: ICD-10-CM

## 2025-06-04 LAB
ACID FAST STN SPEC: NORMAL
MYCOBACTERIUM SPEC CULT: NORMAL

## 2025-06-04 PROCEDURE — 99407 BEHAV CHNG SMOKING > 10 MIN: CPT | Performed by: PHYSICIAN ASSISTANT

## 2025-06-04 PROCEDURE — 1111F DSCHRG MED/CURRENT MED MERGE: CPT | Performed by: PHYSICIAN ASSISTANT

## 2025-06-04 RX ORDER — IBUPROFEN 200 MG
1 TABLET ORAL EVERY 24 HOURS
Qty: 30 PATCH | Refills: 0 | Status: SHIPPED | OUTPATIENT
Start: 2025-06-04 | End: 2025-07-04

## 2025-06-04 NOTE — PROGRESS NOTES
Subjective   Lilly Nina  is a 65 y.o. female who presents for smoking cessation treatment discussion  Audio-Video was not available for the patient, that this was an audio only visit. The patient is located in their home     Smoking history: started at age 13 1ppd till April 2024  Cut down from 1ppd to 0.5ppd as of April when she was diagnosed with lung cancer. She has been successful in cutting back utilizing mints and activities around the house/work.    Previous quit attempts: none    Currently the patient is in their usual state of health. She denies the following symptoms: chest pain, shortness of breath at rest, shortness of breath with activity, cough, hemoptysis, fevers, chills, and weight loss.      There have been no significant changes to their documented medical, surgical and family history.       No admissions to the hospital, trips to the ER or significant medial events since our last visit    She  reports that she has been smoking cigarettes. She started smoking about 51 years ago. She has a 51.3 pack-year smoking history. She has been exposed to tobacco smoke. She has never used smokeless tobacco. She reports that she does not currently use alcohol. She reports that she does not use drugs.    Objective   Physical Exam  Phone visit (audio only evaluation - patient declined request to have a video visit)     Assessment/Plan   Diagnosis: nicotine dependence, lung cancer s/p robotic left lower lobectomy on 5/13/2025 T2aN0    I have counselled patient about need for smoking/tobacco cessation and how I can support efforts when patient is ready to quit. We reviewed the effects of smoking on cancer treatment and recovery for lung surgery; the effect on developing new cancers; and a counseling plan for quitting.   Discussed nicotine replacement therapy, Varenicline, Bupropion, hypnosis, support groups, and acupuncture as potential options.  Patient currently has no signs or symptoms of tobacco  related disease.    Stage: Preparation: intent on taking action  Goal: Quit within 6 months  Pharmacology: smoker, contemplative   Nicotine Patches : 14 mg  Behavioral Approach to Smoking Cessation : gradual quit, support from her community, smoking outside, no smoking in her car  Follow up call: 2 weeks      Sally Davis PA-C  Department of Thoracic and Esophageal Surgery  [facility]  [phone]  Time: I spent 17 minutes reviewing pertinent medical records including imaging and speaking with the patient      Virtual or Telephone Consent    While technically available, the patient was unable or unwilling to consent to connect via audio/video telehealth technology; therefore, I performed this visit using a real-time audio only connection between Lilly Nina & Sally Davis PA-C.  Verbal consent was requested and obtained from Lilly Nina on this date, 06/04/25 for a telehealth visit and the patient's location was confirmed at the time of the visit.

## 2025-06-04 NOTE — PROGRESS NOTES
RN called and spoke to Lilly in regards to scheduling her a NPV with Dr. Maynard. She is scheduled for 6/11 at 12pm. She was informed to check in at desk A on the first floor when she arrives. No further needs at this time.

## 2025-06-09 NOTE — PROGRESS NOTES
[facility] - Medical Oncology New Patient Visit    Patient ID: Lilly Nina is a 65 y.o. female.  Referring Physician: No referring provider defined for this encounter.  Primary Care Provider: MARGARET Hernandez-CNP      Chief Concern: Patient is here to establish care for newly diagnosed lung cancer    HPI   Patient is a 64yo F with oncologic history summarized below, presenting for recommendations regarding adjuvant therapy for lung cancer. Overall she has recovered very well from surgery - still has a little numbness on the side but overall much better. No other concerns.    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 Few subcm nodules bilaterally. Small pericardial effusion  3/26/25 - PET with uptake in LLL mass  4/21/25- Bronch/EBUS for staging with biopsy of LLL mass with NSCLC (squamous), level 7, 4L, 11L nodes neg. Endobronchial tracheal biopsy negative for malignancy (?representative)  4/28/25 - MRI brain negative for metastatic disease  5/13/25 - left lower lobectomy with poorly differentiated nonkeratinizing SCC. Nodes negative, 4cm tumor, no NADIA, VPI, +LVI, margins neg    Past Medical History:  No date: COPD (chronic obstructive pulmonary disease) (Multi)  01/12/2022: Encounter for screening for other disorder      Comment:  Screening for blood or protein in urine  03/2025: Lung cancer, lower lobe (Multi)     Past Surgical History:  05/13/2025: LUNG REMOVAL, PARTIAL; Left      Comment:  Robotic  assisted LLL lobectomy, MLND [69533 (CPT®)] Left  09/09/2021: OTHER SURGICAL HISTORY      Comment:  Hip replacement  09/09/2021: OTHER SURGICAL HISTORY      Comment:  Hip replacement  09/09/2021: OTHER SURGICAL HISTORY      Comment:  Carpal tunnel surgery  09/09/2021: OTHER SURGICAL HISTORY      Comment:  Trigger finger repair     Social Hx:  Lilly Nina  reports that she has been smoking cigarettes. She started smoking about 51 years ago. She has a 51.4 pack-year smoking history. She has been exposed to tobacco smoke. She has never used smokeless tobacco.  She  reports that she does not currently use alcohol.  She  reports no history of drug use.    Still smokes - normal 1ppd, down to <0.5ppd, using patch   Works for [employer], senior programs  Working with tobacco cessation    Family History[1]   No family hx of cancer    Meds (Current):  Current Outpatient Medications   Medication Instructions    acetaminophen (TYLENOL) 650 mg, oral, Every 6 hours    ascorbic acid (Vitamin C) 1,000 mg tablet 1 tablet, Daily    atorvastatin (LIPITOR) 20 mg, oral, Daily    cholecalciferol (Vitamin D-3) 50 mcg (2,000 unit) capsule 1 capsule, Daily    cyanocobalamin (VITAMIN B-12) 1,000 mcg, Daily    doxylamine (Unisom, doxylamine,) 25 mg tablet 1 tablet, Nightly    ibuprofen 600 mg, oral, Every 6 hours scheduled    lidocaine (Lidoderm) 5 % patch 1 patch, transdermal, Every 12 hours, Remove & discard patch within 12 hours or as directed by MD.    lisinopril 5 mg, oral, Daily    nicotine (Nicoderm CQ) 14 mg/24 hr patch 1 patch, transdermal, Every 24 hours    traMADol (ULTRAM) 50 mg, oral, Every 6 hours PRN    traZODone (DESYREL) 100 mg, oral, Nightly PRN        RX Allergies[2]    Review of Systems   Constitutional:  Negative for appetite change, fatigue and unexpected weight change.   HENT:   Negative for hearing loss.    Eyes:  Negative for eye problems.   Respiratory:  Negative for cough and shortness of breath.   "  Cardiovascular:  Negative for chest pain and leg swelling.   Gastrointestinal:  Negative for abdominal pain, constipation, diarrhea, nausea and vomiting.   Genitourinary:  Negative for difficulty urinating.    Musculoskeletal:  Negative for arthralgias and back pain.   Skin:  Negative for rash.   Neurological:  Negative for dizziness and headaches.   Hematological:  Negative for adenopathy.        Objective   BSA: 1.41 meters squared  /65 (BP Location: Left arm, Patient Position: Sitting, BP Cuff Size: Small adult)   Pulse 77   Temp 35.2 °C (95.4 °F) (Skin)   Resp 14   Ht (S) 1.553 m (5' 1.14\")   Wt 46 kg (101 lb 8 oz)   SpO2 99%   BMI 19.09 kg/m²   Performance Status:  (0) Fully active, able to carry on all predisease performance without restriction     Physical Exam  Vitals and nursing note reviewed.   Constitutional:       General: She is not in acute distress.  HENT:      Head: Normocephalic and atraumatic.   Eyes:      Pupils: Pupils are equal, round, and reactive to light.   Cardiovascular:      Rate and Rhythm: Normal rate and regular rhythm.      Heart sounds: Normal heart sounds.   Pulmonary:      Effort: Pulmonary effort is normal.      Breath sounds: Normal breath sounds.   Abdominal:      General: There is no distension.   Musculoskeletal:         General: No swelling.   Skin:     General: Skin is warm and dry.      Findings: No rash.   Neurological:      General: No focal deficit present.      Mental Status: She is alert and oriented to person, place, and time.   Psychiatric:         Mood and Affect: Mood normal.         Behavior: Behavior normal.          Results:  Labs:  Lab Results   Component Value Date    WBC 8.4 05/15/2025    HGB 13.4 05/15/2025    HCT 42.1 05/15/2025    MCV 93 05/15/2025     05/15/2025      No results found for: \"NEUTROABS\"   Lab Results   Component Value Date    GLUCOSE 100 (H) 05/15/2025    CALCIUM 9.3 05/15/2025     (L) 05/15/2025    K 4.5 " "05/15/2025    CO2 30 05/15/2025    CL 97 (L) 05/15/2025    BUN 11 05/15/2025    CREATININE 0.66 05/15/2025     No results found for: \"ALT\", \"AST\", \"GGT\", \"ALKPHOS\", \"BILITOT\"     Imaging:  I have personally reviewed the below imaging and concur with the reported findings unless otherwise stated:    === Results for orders placed during the hospital encounter of 04/21/25 ===    CT chest wo IV contrast for [facility] planning [[id number]] 04/21/2025    Status: Normal  1.  Again seen left lower lobe central infrahilar mass measuring 3.6  x 3.4 cm, unchanged compared to recent prior and corresponding to  hypermetabolic mass seen on recent PET-CT, highly concerning for  primary lung malignancy.  2. Moderate diffuse emphysema with slight upper lung predominance.  Upper lung micronodularity, likely due to smoking related respiratory  bronchiolitis.  3. Additional findings as described above    Signed by: Fabricio Bridges 4/21/2025 10:06 AM  Dictation workstation:   [id number]      === Results for orders placed during the hospital encounter of 03/19/25 ===    CT lung screening low dose [[id number]] 03/19/2025    Status: Normal  1. Spiculated left lower lobe mass measuring up to 3.7 cm, very  suspicious for primary lung malignancy. Further evaluation with  tissue sampling and PET-CT is recommended.  2. Small pericardial effusion. Consider further evaluation with  echocardiography. Given that the above mass abuts the pericardial  surface, underlying pericardial involvement is not excluded.  3. Mild upper lung predominant emphysema.  4. Estimated coronary artery calcium score is 126* which correlates  with at least 80th percentile rank as compared to matched HERRERA-study  subjects(https://[email or web address]-nhlbi.org/Calcium/input.aspx).    LUNG RADS CATEGORY:  Lung Rad: Lung-RADS 4X (Very Suspicious)  Recommendation: Further evaluation with diagnostic chest CT with or  without contrast, may consider PET/CT if solid component is " greater  than or equal to 8mm (268 mm3) or tissue sampling depending on  probability of malignancy, recommended as per American College of  Radiology Guidelines Lung-RADS Version 2022. Management depends on  clinical evaluation, patient preference, and the probability of  malignancy  (https://brocku.ca/lung-cancer-screening-and-risk-prediction/risk-calc  ulators/) Recommend F/U with Thoracic Surgeon.        **The patient's CAC score was measured with an FDA-cleared AI tool  that correlates well with traditional methods. However, due to the  non-gated CT scan and new algorithm, AI-powered scores should not  replace traditional cardiovascular risk assessment. For further  assistance, refer to the [facility] Cardiovascular Prevention  Program via an [facility] referral to 'Cardiology Prevention Program.'    MACRO:  Critical Finding:  See findings. Notification was initiated on  3/19/2025 at 4:42 pm by  Rober Cote.  (**-OCF-**) Instructions:    Signed by: Rober Cote 3/19/2025 4:43 PM  Dictation workstation:   [id number]  === Results for orders placed during the hospital encounter of 04/28/25 ===    MR brain w and wo IV contrast [[id number]] 04/28/2025    Status: Normal  1. No evidence for intracranial metastatic disease or acute findings.  2. A mild degree of presumed chronic microvascular ischemic change  noted in the cerebral white matter. The degree not uncommon for a  patient of this age although I note this patient does have a history  of hypertension as well as a smoking history and hyperlipidemia. Such  findings in the white matter can be more prominent in patients with  all of these conditions.  3. Otherwise, unremarkable intracranial contents.  4. Chronic appearing left maxillary sinusitis redemonstrated with no  aggressive imaging characteristics, or appreciable interval change  from prior PET study.    MACRO:  None    Signed by: Ed Aranda 4/28/2025 1:20 PM  Dictation workstation:   [id number]  No  results found for this or any previous visit.  No results found for this or any previous visit.  === Results for orders placed during the hospital encounter of 03/26/25 ===    NM PET CT lung CA initial diagnosis [[id number]] 03/26/2025    Status: Normal  Significantly intense focus of hypermetabolic tracer activity  corresponding to the reported mass in the anterior left lower lobe.  Finding would be consistent with an active neoplastic process.    Punctate focus corresponding to the posterior aspect of the left  maxillary sinus extending inferiorly into the associated tooth with  associated opacification of the left maxillary sinus. Findings are  concerning for an active inflammatory/infectious process. Further  evaluation and/correlation with dental history is recommended.    No other definite areas of abnormal hypermetabolic tracer activity to  suggest an active neoplastic/metastatic process are identified.    I personally reviewed the image(s) / study and agree with the  findings and interpretation as stated. This study was interpreted at  [facility].    Signed by: Bishnu Rascon 3/26/2025 12:48 PM  Dictation workstation:   [id number]    Pathology:    Reviewed as per HPI/onc summary    Assessment/Plan      Lilly Nina is a 65 y.o. female here for recommendations and to establish care for newly diagnosed [id number], stage Ib squamous cell carcinoma of the lung, with PD-L1 100% and no actionable mutations on NGS.    Reviewed overall workup to date with patient, including findings from surgery showing stage Ib NSCLC. We discussed implications of stage IB disease in terms of overall risk of recurrence. We discussed that for stage II and higher disease, adjuvant chemotherapy has been shown to reduce the risk of recurrent disease and improve overall survival (about 5-10% improvement in overall survival which is stage dependent). For stage IA disease however, adjuvant chemotherapy  has been shown to be detrimental. Data more limited on the role for stage IB disease but can be considered for those with high risk features. Primary benefit in studies that included IB disease was largely driven by those with >4cm tumors, and hers is exactly 4cm. Tumors of 4cm or higher were included in the recent adjuvant immunotherapy trials.    We reviewed that adjuvant immunotherapy to-date is approved and studied after chemotherapy, and we do not have data on this outside of that setting.     I did recommend consideration of adjuvant chemotherapy followed by immunotherapy given 4cm tumor with other high risk features including poorly differentiated histology and LVI.    Options for adjuvant therapy include cisplatin/docetaxel, cisplatin/gemcitabine or carboplatin/paclitaxel. Survival benefit largely seen in the past with cisplatin based regimens. Discussed anticipated side effects. Discussed role of immunotherapy    - Patient would like time to consider options and discuss with family whether she would like to proceed with any adjuvant therapy. She also has 2 upcoming weddings she is traveling for and wants to be able to attend and feel well. She will let us know what she decides. We did discuss optimal timing of adjuvant therapy initiation is around 4-8 weeks after surgery    Michelle Maynard MD  [facility]         [1]   Family History  Problem Relation Name Age of Onset    Hypertension Mother      Hyperlipidemia Mother     [2] No Known Allergies

## 2025-06-11 ENCOUNTER — OFFICE VISIT (OUTPATIENT)
Dept: HEMATOLOGY/ONCOLOGY | Facility: HOSPITAL | Age: 65
End: 2025-06-11
Payer: COMMERCIAL

## 2025-06-11 VITALS
DIASTOLIC BLOOD PRESSURE: 65 MMHG | OXYGEN SATURATION: 99 % | TEMPERATURE: 95.4 F | BODY MASS INDEX: 19.16 KG/M2 | RESPIRATION RATE: 14 BRPM | SYSTOLIC BLOOD PRESSURE: 115 MMHG | HEART RATE: 77 BPM | HEIGHT: 61 IN | WEIGHT: 101.5 LBS

## 2025-06-11 DIAGNOSIS — C34.92 NSCLC OF LEFT LUNG (MULTI): Primary | ICD-10-CM

## 2025-06-11 LAB
ACID FAST STN SPEC: NORMAL
MYCOBACTERIUM SPEC CULT: NORMAL

## 2025-06-11 PROCEDURE — 3008F BODY MASS INDEX DOCD: CPT | Performed by: INTERNAL MEDICINE

## 2025-06-11 PROCEDURE — 3074F SYST BP LT 130 MM HG: CPT | Performed by: INTERNAL MEDICINE

## 2025-06-11 PROCEDURE — 99205 OFFICE O/P NEW HI 60 MIN: CPT | Performed by: INTERNAL MEDICINE

## 2025-06-11 PROCEDURE — 1159F MED LIST DOCD IN RCRD: CPT | Performed by: INTERNAL MEDICINE

## 2025-06-11 PROCEDURE — 1111F DSCHRG MED/CURRENT MED MERGE: CPT | Performed by: INTERNAL MEDICINE

## 2025-06-11 PROCEDURE — 3078F DIAST BP <80 MM HG: CPT | Performed by: INTERNAL MEDICINE

## 2025-06-11 PROCEDURE — 99215 OFFICE O/P EST HI 40 MIN: CPT | Performed by: INTERNAL MEDICINE

## 2025-06-11 PROCEDURE — 1126F AMNT PAIN NOTED NONE PRSNT: CPT | Performed by: INTERNAL MEDICINE

## 2025-06-11 ASSESSMENT — ENCOUNTER SYMPTOMS
DEPRESSION: 0
LOSS OF SENSATION IN FEET: 0
OCCASIONAL FEELINGS OF UNSTEADINESS: 0

## 2025-06-11 ASSESSMENT — PAIN SCALES - GENERAL: PAINLEVEL_OUTOF10: 0-NO PAIN

## 2025-06-18 ENCOUNTER — TELEMEDICINE (OUTPATIENT)
Dept: SURGERY | Facility: HOSPITAL | Age: 65
End: 2025-06-18
Payer: COMMERCIAL

## 2025-06-18 DIAGNOSIS — F17.200 TOBACCO USE DISORDER: Primary | ICD-10-CM

## 2025-06-18 DIAGNOSIS — C34.92 NSCLC OF LEFT LUNG (MULTI): ICD-10-CM

## 2025-06-18 PROCEDURE — 99407 BEHAV CHNG SMOKING > 10 MIN: CPT | Performed by: PHYSICIAN ASSISTANT

## 2025-06-18 NOTE — PROGRESS NOTES
Subjective   Lilly Nina  is a 65 y.o. female who presents for smoking cessation treatment discussion  Audio-Video was not available for the patient, that this was an audio only visit. The patient is located in their home     Smoking history: started at age 13 1ppd till April 2024  Cut down from 1ppd to 0.5ppd as of April when she was diagnosed with lung cancer. She has been successful in cutting back utilizing mints and activities around the house/work.     Previous quit attempts: none  6/4/2025: gradual quit method, smoke outside, no smoking in her car    Over the past two weeks she has been smoking 6-8 cigarettes a day. No longer smoking in her home. Minimally in her car. Compliant with 14mg patches       Currently the patient is presenting for routine follow-up. She denies the following symptoms: chest pain, shortness of breath at rest, shortness of breath with activity, cough, hemoptysis, fevers, chills, and weight loss.      There have been no significant changes to their documented medical, surgical and family history.       No admissions to the hospital, trips to the ER or significant medial events since our last visit    Saw Dr Maynard 6/11 - recommended to have adjuvant chemo for surgical path showing robotic assisted LLL lobectomy on 5/13/25 T2aN0 squamous cell carcinoma about 4 cm in size.    She  reports that she has been smoking cigarettes. She started smoking about 51 years ago. She has a 51.3 pack-year smoking history. She has been exposed to tobacco smoke. She has never used smokeless tobacco. She reports that she does not currently use alcohol. She reports that she does not use drugs.    Objective   Physical Exam  Phone visit (audio only evaluation - patient declined request to have a video visit)     Assessment/Plan   Diagnosis: Tobacco Dependence     I have counselled patient about need for smoking/tobacco cessation and how I can support efforts when patient is ready to quit. We reviewed  the effects of smoking on cancer treatment and recovery for lung surgery; the effect on developing new cancers; and a counseling plan for quitting.   Discussed nicotine replacement therapy, Varenicline, Bupropion, hypnosis, support groups, and acupuncture as potential options.  Patient currently has no signs or symptoms of tobacco related disease.    Stage: Maintenance: sustained change/new behavior  Goal: Quit within 6 months  Pharmacology: smoker, action phase   Nicotine Patches : 14 mg  Behavioral Approach to Smoking Cessation : She has declined additional modifications at this time. Will continue 14mg patch for total 6 weeks, goal to cut down cigarettes 5/day. No smoking in her car   Follow up call: 2 weeks      Sally Davis PA-C  Department of Thoracic and Esophageal Surgery  [facility]  [phone]  Time: I spent 12 minutes reviewing pertinent medical records including imaging and speaking with the patient     Virtual or Telephone Consent    While technically available, the patient was unable or unwilling to consent to connect via audio/video telehealth technology; therefore, I performed this visit using a real-time audio only connection between Lilly Nina & Sally Davis PA-C.  Verbal consent was requested and obtained from Lilly Nina on this date, 06/18/25 for a telehealth visit and the patient's location was confirmed at the time of the visit.

## 2025-06-20 ENCOUNTER — APPOINTMENT (OUTPATIENT)
Dept: PULMONOLOGY | Facility: CLINIC | Age: 65
End: 2025-06-20
Payer: COMMERCIAL

## 2025-06-20 VITALS
OXYGEN SATURATION: 89 % | HEART RATE: 66 BPM | DIASTOLIC BLOOD PRESSURE: 71 MMHG | TEMPERATURE: 95.5 F | BODY MASS INDEX: 18.33 KG/M2 | WEIGHT: 99.6 LBS | HEIGHT: 62 IN | SYSTOLIC BLOOD PRESSURE: 108 MMHG

## 2025-06-20 DIAGNOSIS — C34.92 NSCLC OF LEFT LUNG (MULTI): ICD-10-CM

## 2025-06-20 DIAGNOSIS — J43.2 CENTRILOBULAR EMPHYSEMA (MULTI): Primary | ICD-10-CM

## 2025-06-20 DIAGNOSIS — F17.210 CIGARETTE SMOKER: ICD-10-CM

## 2025-06-20 PROCEDURE — 99202 OFFICE O/P NEW SF 15 MIN: CPT | Performed by: INTERNAL MEDICINE

## 2025-06-20 PROCEDURE — 99406 BEHAV CHNG SMOKING 3-10 MIN: CPT | Performed by: INTERNAL MEDICINE

## 2025-06-20 NOTE — PROGRESS NOTES
Department of Medicine  Division of Pulmonary, Critical Care, and Sleep Medicine  Location  [facility], [address]    I was asked to evaluate Lilly Nina for COPD. I have independently interviewed and examined the patient in the office and reviewed available records.     Physician HPI (6/20/2025):  65 y.o. female who was found to have an incidental left lower lobe mass on screening CT chest from March.  She underwent ENB/EBUS, with pathology being positive for squamous cell carcinoma.  Following staging PET/CT and negative brain MRI, she underwent left lower lobe lobectomy with mediastinal lymph node dissection in May 2025.  All lymph nodes were negative for malignancy. Her current stage is 1B. She is considering taking adjuvant chemotherapy.  She feels that she has recovered well from her surgery.  Her neuropathy is improving, and her soreness at the port sites is also markedly improved.    From breathing standpoint, she has no complaints.  She has never been hospitalized for COPD.  She denies any cough, wheeze, shortness of breath, chest tightness.  She has never been prescribed any bronchodilator therapy.  She has no known history of COPD or lung cancer in her family.  She is an active smoker for the past 50 years, and has cut down from 1 pack/day to approximately 6 cigarettes/day.  She is currently enrolled in the  tobacco cessation program.        PMH:  Medical History[1]    PSH:  Surgical History[2]    FHx:  Family History[3]    Social Hx:  Social History[4]    Immunization History:  Immunization History   Administered Date(s) Administered    COVID-19, mRNA, LNP-S, PF, 30 mcg/0.3 mL dose 03/04/2021, 03/27/2021, 10/23/2021    Flu vaccine (IIV4), preservative free *Check age/dose* 10/05/2018, 10/04/2019, 10/03/2020, 10/01/2021    Flu vaccine, quadrivalent, no egg protein, age 6 month or greater (FLUCELVAX) 10/06/2022    Flu vaccine, trivalent, preservative free, HIGH-DOSE, age 65y+  (Fluzone) 10/03/2024    Influenza, Seasonal, Quadrivalent, Adjuvanted 10/08/2023    Influenza, Unspecified 12/07/2009, 10/01/2011    Influenza, injectable, quadrivalent 09/27/2016    Influenza, seasonal, injectable 10/10/2014, 10/15/2015, 10/06/2017    Pfizer COVID-19 vaccine, bivalent, age 12 years and older (30 mcg/0.3 mL) 01/07/2023    Pneumococcal conjugate vaccine, 13-valent (PREVNAR 13) 09/27/2016    Pneumococcal conjugate vaccine, 20-valent (PREVNAR 20) 07/31/2023    Pneumococcal polysaccharide vaccine, 23-valent, age 2 years and older (PNEUMOVAX 23) 12/07/2009    Td vaccine, age 7 years and older (TDVAX) 12/13/2004    Tdap vaccine, age 7 year and older (BOOSTRIX, ADACEL) 04/02/2014    Zoster vaccine, recombinant, adult (SHINGRIX) 09/11/2021, 11/12/2021    Zoster, live 03/21/2013       Current Medications:  Current Outpatient Medications   Medication Instructions    acetaminophen (TYLENOL) 650 mg, oral, Every 6 hours    ascorbic acid (Vitamin C) 1,000 mg tablet 1 tablet, Daily    atorvastatin (LIPITOR) 20 mg, oral, Daily    cholecalciferol (Vitamin D-3) 50 mcg (2,000 unit) capsule 1 capsule, Daily    cyanocobalamin (VITAMIN B-12) 1,000 mcg, Daily    doxylamine (Unisom, doxylamine,) 25 mg tablet 1 tablet, Nightly    ibuprofen 600 mg, oral, Every 6 hours scheduled    lidocaine (Lidoderm) 5 % patch 1 patch, transdermal, Every 12 hours, Remove & discard patch within 12 hours or as directed by MD.    lisinopril 5 mg, oral, Daily    nicotine (Nicoderm CQ) 14 mg/24 hr patch 1 patch, transdermal, Every 24 hours    traMADol (ULTRAM) 50 mg, oral, Every 6 hours PRN    traZODone (DESYREL) 100 mg, oral, Nightly PRN        Drug Allergies/Intolerances:  RX Allergies[5]     Review of Systems:  Review of Systems     All other review of systems are negative and/or non-contributory.    Physical Examination:  Vitals:    06/20/25 1017   BP: 108/71   Pulse: 66   Temp: 35.3 °C (95.5 °F)   SpO2: (!) 89%   Weight: 45.2 kg (99 lb 9.6  "oz)   Height: 1.575 m (5' 2\")        GEN: appears well  CV: RRR, no m/g/r  LUNGS: good effort, clear bilaterally, no w/r/r  EXT: no edema, cyanosis, clubbing      Exacerbation History      None    Pulmonary Function Test Results     2025:  FVC: 2.76 L (101%)  FEV1: 1.58 L (74%)  FEV1/FVC: 0.57  T.24 L (110%)  RV: 145%  DLCO: 79%  FEV1 increased by 150 mL and 11%    Sleep Study     none    CAT and mMRC       mMRC (Modified Medical Research [place]) Dyspnea Scale  Dyspnea only with strenuous exercise: 0    Reference: Chary DA, Eliseo CK. Evaluation of clinical methods for rating dyspnea. CHEST. 1988;93(3):580-6.    Peak Flow and ACT     N/A    Chest Radiograph     XR chest 2 views 2025      Impression  1.  Left basilar airspace disease and left effusion similar to the  prior. No acute abnormality seen.  2. Previously seen subtle left apical pneumothorax not well  visualized on this study, which may be projectional      Chest CT Scan     2025:  1.  Again seen left lower lobe central infrahilar mass measuring 3.6  x 3.4 cm, unchanged compared to recent prior and corresponding to  hypermetabolic mass seen on recent PET-CT, highly concerning for  primary lung malignancy.  2. Moderate diffuse emphysema with slight upper lung predominance.  Upper lung micronodularity, likely due to smoking related respiratory  bronchiolitis.    PET/CT 3/26/2025:  Significantly intense focus of hypermetabolic tracer activity  corresponding to the reported mass in the anterior left lower lobe (SUV 13).  Finding would be consistent with an active neoplastic process.      Punctate focus corresponding to the posterior aspect of the left  maxillary sinus extending inferiorly into the associated tooth with  associated opacification of the left maxillary sinus. Findings are  concerning for an active inflammatory/infectious process. Further  evaluation and/correlation with dental history is recommended.      CT lung screening " low dose 03/19/2025  FINDINGS:  LUNGS AND AIRWAYS:  The trachea and central airways are patent. No endobronchial lesion  is seen. There is mild diffuse bronchial wall thickening. Scattered  areas of nodularity within the anti dependent portion of the trachea  are nonspecific and may represent areas of adherent secretions.    There is mild bilateral upper lung predominant centrilobular  emphysematous change. There is an ovoid juxtapleural consolidation in  the anterior left lower lobe measuring 3.7 x 3.4 cm (series 4, image  195) with spiculated margins (series 602, image 71) slight retraction  of the adjacent bronchial segments, relatively acute angle to the  pleura. The mass abuts the left pericardium. There is no other focal  consolidation, no pleural effusion, or pneumothorax.    There are few bilateral noncalcified pulmonary nodules seen. For  example,measuring up to 0.2 cm in the posterior right lower lobe  (series 4, image 160). There has also a 0.5 cm nodule in the right  upper lobe on series 4, image 58    Impression  1. Spiculated left lower lobe mass measuring up to 3.7 cm, very  suspicious for primary lung malignancy. Further evaluation with  tissue sampling and PET-CT is recommended.  2. Small pericardial effusion. Consider further evaluation with  echocardiography. Given that the above mass abuts the pericardial  surface, underlying pericardial involvement is not excluded.  3. Mild upper lung predominant emphysema.  4. Estimated coronary artery calcium score is 126* which correlates  with at least 80th percentile rank as compared to matched HERRERA-study  subjects([email or web address]).    Bronchoscopy     4/21/2025:   Bronchial anatomy is normal is notable for nodular lesions of the trachea, right mainstem, and left mainstem which are localized to the tracheobronchial rings. Consistent with tracheobronchopathia osteochondroplastica     The bronchoscope was navigated to the medial  basilar segment of the left lower lobe. Radial US was then passed into this segment and a concentric image of the left lower lobe mass was obtained. Boundaries of the mass were marked on fluoroscopy. A 21g Archpoint needle was then used to obtain transbronchal FNA biopsies of the left lower lobe mass. 9 Passes were completed.     The 4R (lower paratracheal) node measured 3.8 mm in size. Sampling was not done due to size criteria (less than 5 mm).  The 2R (upper paratracheal) node was not visualized. Sampling was not done as the node was not visualized.  The 7 (subcarinal) node measured 6.2 mm in size. Sampling was done, 3 samples with the needle were obtained.  The 2L (upper paratracheal) node measured 2.5 mm in size. Sampling was not done due to size criteria (less than 5 mm).  The 4L (lower paratracheal) node measured 5.1 mm in size. Sampling was done, 3 samples with the needle were obtained.  The 11L (interlobar) node measured 5.5 mm in size. Sampling was done, 3 samples with the needle were obtained.      A. LUNG FINE NEEDLE ASPIRATION LEFT LOWER LOBE- LLL MASS                                Malignant cells derived from non-small cell carcinoma, favor squamous carcinoma, see note.                   Note: The malignant cells are focally positive for p40 and negative for TTF1, chromogranin and synaptophysin.                   Molecular testing has been ordered and results will be issued in a separate report.  The cell block contains low tumor cellularity representing roughly 20% of all nucleated cells.                      B. LYMPH NODE 7 PULMONARY FINE NEEDLE ASPIRATION                                         No malignant cells identified                Lymphoid sample    C. LYMPH NODE 4 L PULMONARY FINE NEEDLE ASPIRATION                     No malignant cells identified                Lymphoid sample    D. LYMPH NODE 11 L PULMONARY FINE NEEDLE ASPIRATION                          No malignant cells identified      "           Lymphoid sample    Labs     Lab Results   Component Value Date    WBC 8.4 05/15/2025    HGB 13.4 05/15/2025    HCT 42.1 05/15/2025    MCV 93 05/15/2025     05/15/2025     No results found for: \"BNP\"  No results found for: \"IGE\", \"EOSABS\", \"A1ANT\", \"W5CVCMJR\", \"ICIGE\"  CARBON DIOXIDE (mmol/L)   Date Value   05/06/2025 27   04/16/2025 27     Bicarbonate (mmol/L)   Date Value   05/15/2025 30   05/14/2025 30   03/21/2024 30       Echocardiogram     4/23/2025:   1. Left ventricular ejection fraction is normal, calculated by Ayon's biplane at 72%.   2. There is no pericardial effusion noted.      ASSESSMENT & PLAN     Problem List Items Addressed This Visit       Centrilobular emphysema (Multi) - Primary    Overview   Noted on chest CT  No wheeze  Stop smoking         Relevant Orders    Follow Up In Pulmonology    Cigarette smoker    Overview   Patient started wearing a patch  Need to stop smoking to reduce her cardiac and pulmonary risks         NSCLC of left lung (Multi)        Summary:  65 y.o. female  with mild GOLD group A COPD (FEV1 74%). Patient is doing overall well.  Absolute eosinophils not checked. A1AT not tested. 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  She is still smoking.    PLAN:  -Bronchodilators: doing overall well. Discussed risks/benefits/alternatives of LAMA/LABA therapy. We opted to hold off initiating bronchodilator therapy at this time due to lack of symptoms  -Vaccinations: influenza: received; pneumococcal: completed series; COVID: received  -Lung cancer screening: next CT chest scheduled 11/26/2025  -Smoking cessation: already enrolled in Tobacco Cessation Program. Counseled on cessation for 3 minutes  -Pulmonary rehab: not required  -Oxygen: not required. Repeat Pulse Ox improved to 95%. Can consider O2 desat study at next visit    Follow-up: 6 months      Marquita Rogers DO  Staff " Physician - Pulmonary & Critical Care  06/20/25 10:32 AM  Office number: [phone]   Fax number:  [phone]          [1]   Past Medical History:  Diagnosis Date    COPD (chronic obstructive pulmonary disease) (Multi)     Encounter for screening for other disorder 01/12/2022    Screening for blood or protein in urine    Lung cancer, lower lobe (Multi) 03/2025   [2]   Past Surgical History:  Procedure Laterality Date    LUNG REMOVAL, PARTIAL Left 05/13/2025    Robotic assisted LLL lobectomy, MLND [56526 (CPT®)] Left    OTHER SURGICAL HISTORY  09/09/2021    Hip replacement    OTHER SURGICAL HISTORY  09/09/2021    Hip replacement    OTHER SURGICAL HISTORY  09/09/2021    Carpal tunnel surgery    OTHER SURGICAL HISTORY  09/09/2021    Trigger finger repair   [3]   Family History  Problem Relation Name Age of Onset    Hypertension Mother      Hyperlipidemia Mother     [4]   Social History  Socioeconomic History    Marital status:    Tobacco Use    Smoking status: Every Day     Current packs/day: 0.50     Average packs/day: 1 pack/day for 51.5 years (51.4 ttl pk-yrs)     Types: Cigarettes     Start date: 1974     Passive exposure: Current    Smokeless tobacco: Never   Vaping Use    Vaping status: Never Used   Substance and Sexual Activity    Alcohol use: Not Currently     Comment: social 1 glass of wine    Drug use: Never    Sexual activity: Defer     Social Drivers of Health     Financial Resource Strain: Low Risk  (5/13/2025)    Overall Financial Resource Strain (CARDIA)     Difficulty of Paying Living Expenses: Not very hard   Food Insecurity: No Food Insecurity (5/13/2025)    Hunger Vital Sign     Worried About Running Out of Food in the Last Year: Never true     Ran Out of Food in the Last Year: Never true   Transportation Needs: No Transportation Needs (5/13/2025)    PRAPARE - Transportation     Lack of Transportation (Medical): No     Lack of Transportation (Non-Medical): No   Physical Activity:  Insufficiently Active (4/25/2020)    Received from [facility]    Exercise Vital Sign     Days of Exercise per Week: 4 days     Minutes of Exercise per Session: 30 min   Stress: No Stress Concern Present (4/25/2020)    Received from [facility]    [place] [employer] - Occupational Stress Questionnaire     Feeling of Stress : Not at all   Social Connections: Unknown (4/25/2020)    Received from [facility]    Social Connection and Isolation Panel [NHANES]     Frequency of Communication with Friends and Family: Twice a week     Frequency of Social Gatherings with Friends and Family: Twice a week     Attends Religion Services: Patient declined     Active Member of Clubs or Organizations: Yes     Attends Club or Organization Meetings: 1 to 4 times per year     Marital Status:    Intimate Partner Violence: Not At Risk (5/13/2025)    Humiliation, Afraid, Rape, and Kick questionnaire     Fear of Current or Ex-Partner: No     Emotionally Abused: No     Physically Abused: No     Sexually Abused: No   Housing Stability: Low Risk  (5/13/2025)    Housing Stability Vital Sign     Unable to Pay for Housing in the Last Year: No     Number of Times Moved in the Last Year: 0     Homeless in the Last Year: No   [5] No Known Allergies

## 2025-06-22 ASSESSMENT — ENCOUNTER SYMPTOMS
NAUSEA: 0
COUGH: 0
DIARRHEA: 0
LEG SWELLING: 0
HEADACHES: 0
APPETITE CHANGE: 0
UNEXPECTED WEIGHT CHANGE: 0
ADENOPATHY: 0
BACK PAIN: 0
CONSTIPATION: 0
VOMITING: 0
ABDOMINAL PAIN: 0
ARTHRALGIAS: 0
SHORTNESS OF BREATH: 0
DIZZINESS: 0
DIFFICULTY URINATING: 0
FATIGUE: 0
EYE PROBLEMS: 0

## 2025-06-24 ENCOUNTER — TELEPHONE (OUTPATIENT)
Dept: HEMATOLOGY/ONCOLOGY | Facility: HOSPITAL | Age: 65
End: 2025-06-24
Payer: COMMERCIAL

## 2025-06-24 NOTE — TELEPHONE ENCOUNTER
RN called and spoke to Lilly in regards to seeing if she has decided to go through with treatment. Lilly says that she would like to get treatment. RN scheduled her to meet with Dr. Maynard on 7/2 at 9am.

## 2025-06-27 NOTE — PROGRESS NOTES
Subjective   Lilly Nina  is a 65 y.o. female who presents for smoking cessation treatment discussion. Seen in clinic in conjunction with Dr Maynard    Smoking history: started at age 13 1ppd till April 2024  Cut down from 1ppd to 0.5ppd as of April when she was diagnosed with lung cancer. She has been successful in cutting back utilizing mints and activities around the house/work.     Previous quit attempts: none  6/4/2025: gradual quit method, smoke outside, no smoking in her car  7/2/2025: she reports that she has been smoking 1/2ppd, shares that she is very stressed with her home life, working full time and starting chemo. She isnt smoking in her car or in front of her grand children. Has been compliant with patches        Currently the patient is presenting for routine follow-up. She denies the following symptoms: chest pain, shortness of breath at rest, shortness of breath with activity, cough, hemoptysis, fevers, chills, and weight loss. Reports stress    There have been no significant changes to their documented medical, surgical and family history.       No admissions to the hospital, trips to the ER or significant medial events since our last visit    She  reports that she has been smoking cigarettes. She started smoking about 51 years ago. She has a 51.5 pack-year smoking history. She has been exposed to tobacco smoke. She has never used smokeless tobacco. She reports current alcohol use of about 2.0 standard drinks of alcohol per week. She reports that she does not use drugs.    Objective   Physical Exam  The patient is well-appearing and tearful. The trachea is midline and there is no crepitus. The lungs were clear to auscultation grossly. There was good effort and excursion. The heart had a regular rate and rhythm. The abdomen was soft, nontender and nondistended. The extremities had no edema or gross deformities. Mood and affect are appropriate.    /68 (BP Location: Left arm, Patient  Position: Sitting, BP Cuff Size: Small adult)   Pulse 77   Temp 36.2 °C (97.2 °F) (Skin)   Resp 14   Wt 45.1 kg (99 lb 6.4 oz)   SpO2 98%   BMI 18.18 kg/m²        Assessment/Plan   Diagnosis: Tobacco Dependence         I have counselled patient about need for smoking/tobacco cessation and how I can support efforts when patient is ready to quit. We reviewed the effects of smoking on cancer treatment and recovery for lung surgery; the effect on developing new cancers; and a counseling plan for quitting.   Discussed nicotine replacement therapy, Varenicline, Bupropion, hypnosis, support groups, and acupuncture as potential options.  Patient currently has no signs or symptoms of tobacco related disease.    Stage: Preparation: intent on taking action  Goal: Not ready to set a goal quit date  Pharmacology: smoker, not ready   Nicotine Patches : 14 mg  Behavioral Approach to Smoking Cessation : she is overwhelmed starting treatment, working full time, has her daughter and 3 grandchildren living with her. She would like to maintain her current plan of patches and gradual quit method  Follow up call: per her request 6 weeks       Sally Davis PA-C  Department of Thoracic and Esophageal Surgery  [facility]  [phone]  Time: I spent 15 minutes reviewing pertinent medical records including imaging and speaking with the patient

## 2025-07-02 ENCOUNTER — TELEMEDICINE (OUTPATIENT)
Dept: SURGERY | Facility: HOSPITAL | Age: 65
End: 2025-07-02
Payer: COMMERCIAL

## 2025-07-02 ENCOUNTER — OFFICE VISIT (OUTPATIENT)
Dept: HEMATOLOGY/ONCOLOGY | Facility: HOSPITAL | Age: 65
End: 2025-07-02
Payer: COMMERCIAL

## 2025-07-02 VITALS
WEIGHT: 99.4 LBS | OXYGEN SATURATION: 98 % | SYSTOLIC BLOOD PRESSURE: 119 MMHG | RESPIRATION RATE: 14 BRPM | TEMPERATURE: 97.2 F | DIASTOLIC BLOOD PRESSURE: 68 MMHG | BODY MASS INDEX: 18.18 KG/M2 | HEART RATE: 77 BPM

## 2025-07-02 DIAGNOSIS — F17.200 TOBACCO DEPENDENCE: ICD-10-CM

## 2025-07-02 DIAGNOSIS — F17.200 TOBACCO USE DISORDER: Primary | ICD-10-CM

## 2025-07-02 DIAGNOSIS — C34.92 NSCLC OF LEFT LUNG (MULTI): Primary | ICD-10-CM

## 2025-07-02 PROCEDURE — 1126F AMNT PAIN NOTED NONE PRSNT: CPT | Performed by: INTERNAL MEDICINE

## 2025-07-02 PROCEDURE — 99214 OFFICE O/P EST MOD 30 MIN: CPT | Performed by: INTERNAL MEDICINE

## 2025-07-02 PROCEDURE — 1159F MED LIST DOCD IN RCRD: CPT | Performed by: INTERNAL MEDICINE

## 2025-07-02 PROCEDURE — 99407 BEHAV CHNG SMOKING > 10 MIN: CPT | Performed by: PHYSICIAN ASSISTANT

## 2025-07-02 PROCEDURE — 98012 SYNCH AUDIO-ONLY EST SF 10: CPT | Performed by: PHYSICIAN ASSISTANT

## 2025-07-02 PROCEDURE — 3074F SYST BP LT 130 MM HG: CPT | Performed by: INTERNAL MEDICINE

## 2025-07-02 PROCEDURE — 3078F DIAST BP <80 MM HG: CPT | Performed by: INTERNAL MEDICINE

## 2025-07-02 RX ORDER — PROCHLORPERAZINE EDISYLATE 5 MG/ML
10 INJECTION INTRAMUSCULAR; INTRAVENOUS EVERY 6 HOURS PRN
OUTPATIENT
Start: 2025-07-10

## 2025-07-02 RX ORDER — DEXAMETHASONE 4 MG/1
8 TABLET ORAL DAILY
Qty: 6 TABLET | Refills: 5 | Status: SHIPPED | OUTPATIENT
Start: 2025-07-02

## 2025-07-02 RX ORDER — ALBUTEROL SULFATE 0.83 MG/ML
3 SOLUTION RESPIRATORY (INHALATION) AS NEEDED
OUTPATIENT
Start: 2025-07-10

## 2025-07-02 RX ORDER — PROCHLORPERAZINE MALEATE 10 MG
10 TABLET ORAL EVERY 6 HOURS PRN
OUTPATIENT
Start: 2025-07-10

## 2025-07-02 RX ORDER — PALONOSETRON 0.05 MG/ML
0.25 INJECTION, SOLUTION INTRAVENOUS ONCE
OUTPATIENT
Start: 2025-07-10

## 2025-07-02 RX ORDER — PROCHLORPERAZINE EDISYLATE 5 MG/ML
10 INJECTION INTRAMUSCULAR; INTRAVENOUS EVERY 6 HOURS PRN
OUTPATIENT
Start: 2025-07-17

## 2025-07-02 RX ORDER — EPINEPHRINE 0.3 MG/.3ML
0.3 INJECTION SUBCUTANEOUS EVERY 5 MIN PRN
OUTPATIENT
Start: 2025-07-17

## 2025-07-02 RX ORDER — OLANZAPINE 5 MG/1
5 TABLET, FILM COATED ORAL NIGHTLY
Qty: 4 TABLET | Refills: 5 | Status: SHIPPED | OUTPATIENT
Start: 2025-07-02

## 2025-07-02 RX ORDER — ONDANSETRON 8 MG/1
8 TABLET, FILM COATED ORAL EVERY 8 HOURS PRN
Qty: 30 TABLET | Refills: 5 | Status: SHIPPED | OUTPATIENT
Start: 2025-07-02

## 2025-07-02 RX ORDER — DIPHENHYDRAMINE HYDROCHLORIDE 50 MG/ML
50 INJECTION, SOLUTION INTRAMUSCULAR; INTRAVENOUS AS NEEDED
OUTPATIENT
Start: 2025-07-17

## 2025-07-02 RX ORDER — EPINEPHRINE 0.3 MG/.3ML
0.3 INJECTION SUBCUTANEOUS EVERY 5 MIN PRN
OUTPATIENT
Start: 2025-07-10

## 2025-07-02 RX ORDER — DIPHENHYDRAMINE HYDROCHLORIDE 50 MG/ML
50 INJECTION, SOLUTION INTRAMUSCULAR; INTRAVENOUS AS NEEDED
OUTPATIENT
Start: 2025-07-10

## 2025-07-02 RX ORDER — ALBUTEROL SULFATE 0.83 MG/ML
3 SOLUTION RESPIRATORY (INHALATION) AS NEEDED
OUTPATIENT
Start: 2025-07-17

## 2025-07-02 RX ORDER — FAMOTIDINE 10 MG/ML
20 INJECTION, SOLUTION INTRAVENOUS ONCE AS NEEDED
OUTPATIENT
Start: 2025-07-17

## 2025-07-02 RX ORDER — FAMOTIDINE 10 MG/ML
20 INJECTION, SOLUTION INTRAVENOUS ONCE AS NEEDED
OUTPATIENT
Start: 2025-07-10

## 2025-07-02 RX ORDER — PROCHLORPERAZINE MALEATE 10 MG
10 TABLET ORAL EVERY 6 HOURS PRN
OUTPATIENT
Start: 2025-07-17

## 2025-07-02 RX ORDER — ONDANSETRON HYDROCHLORIDE 2 MG/ML
8 INJECTION, SOLUTION INTRAVENOUS ONCE
OUTPATIENT
Start: 2025-07-17

## 2025-07-02 RX ORDER — PROCHLORPERAZINE MALEATE 10 MG
10 TABLET ORAL EVERY 6 HOURS PRN
Qty: 30 TABLET | Refills: 5 | Status: SHIPPED | OUTPATIENT
Start: 2025-07-02

## 2025-07-02 ASSESSMENT — PAIN SCALES - GENERAL: PAINLEVEL_OUTOF10: 0-NO PAIN

## 2025-07-06 ASSESSMENT — ENCOUNTER SYMPTOMS
BACK PAIN: 0
ABDOMINAL PAIN: 0
ADENOPATHY: 0
NAUSEA: 0
CONSTIPATION: 0
HEADACHES: 0
VOMITING: 0
APPETITE CHANGE: 0
SHORTNESS OF BREATH: 0
UNEXPECTED WEIGHT CHANGE: 0
DIFFICULTY URINATING: 0
DIZZINESS: 0
DIARRHEA: 0
EYE PROBLEMS: 0
FATIGUE: 0
COUGH: 0
LEG SWELLING: 0
ARTHRALGIAS: 0

## 2025-07-06 NOTE — PROGRESS NOTES
[facility] - Medical Oncology Follow-Up Visit    Patient ID: Lilly Nina is a 65 y.o. female.  Referring Physician: No referring provider defined for this encounter.  Primary Care Provider: MARGARET Hernandez-CNP      Chief Concern: Patient is here to follow-up and finalize tx plans    HPI   Patient overall reports feeling well. 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 Few subcm nodules bilaterally. Small pericardial effusion  3/26/25 - PET with uptake in LLL mass  4/21/25- Bronch/EBUS for staging with biopsy of LLL mass with NSCLC (squamous), level 7, 4L, 11L nodes neg. Endobronchial tracheal biopsy negative for malignancy (?representative)  4/28/25 - MRI brain negative for metastatic disease  5/13/25 - left lower lobectomy with poorly differentiated nonkeratinizing SCC. Nodes negative, 4cm tumor, no NADIA, VPI, +LVI, margins neg    Past Medical History:  No date: COPD (chronic obstructive pulmonary disease) (Multi)  01/12/2022: Encounter for screening for other disorder      Comment:  Screening for blood or protein in urine  No date: Hypertension  03/2025: Lung cancer, lower lobe (Multi)     Past Surgical History:  05/13/2025: LUNG REMOVAL, PARTIAL; Left      Comment:  Robotic assisted LLL lobectomy, MLND [68909 (CPT®)] Left  09/09/2021: OTHER SURGICAL HISTORY      Comment:  Hip replacement  09/09/2021: OTHER SURGICAL HISTORY      Comment:  Hip replacement  09/09/2021:  OTHER SURGICAL HISTORY      Comment:  Carpal tunnel surgery  09/09/2021: OTHER SURGICAL HISTORY      Comment:  Trigger finger repair     Social Hx:  Lilly Nina  reports that she has been smoking cigarettes. She started smoking about 51 years ago. She has a 51.5 pack-year smoking history. She has been exposed to tobacco smoke. She has never used smokeless tobacco.  She  reports current alcohol use of about 2.0 standard drinks of alcohol per week.  She  reports no history of drug use.    Still smokes - normal 1ppd, down to <0.5ppd, using patch   Works for [employer], senior programs  Working with tobacco cessation    Family History[1]   No family hx of cancer    Meds (Current):  Current Outpatient Medications   Medication Instructions    acetaminophen (TYLENOL) 650 mg, oral, Every 6 hours    ascorbic acid (Vitamin C) 1,000 mg tablet 1 tablet, Daily    atorvastatin (LIPITOR) 20 mg, oral, Daily    cholecalciferol (Vitamin D-3) 50 mcg (2,000 unit) capsule 1 capsule, Daily    cyanocobalamin (VITAMIN B-12) 1,000 mcg, Daily    dexAMETHasone (DECADRON) 8 mg, oral, Daily, For 3 days starting the day after treatment    doxylamine (Unisom, doxylamine,) 25 mg tablet 1 tablet, Nightly    ibuprofen 600 mg, oral, Every 6 hours scheduled    lidocaine (Lidoderm) 5 % patch 1 patch, transdermal, Every 12 hours, Remove & discard patch within 12 hours or as directed by MD.    lisinopril 5 mg, oral, Daily    nicotine (Nicoderm CQ) 14 mg/24 hr patch 1 patch, transdermal, Every 24 hours    OLANZapine (ZYPREXA) 5 mg, oral, Nightly, For 4 days starting the evening of treatment    ondansetron (ZOFRAN) 8 mg, oral, Every 8 hours PRN    prochlorperazine (COMPAZINE) 10 mg, oral, Every 6 hours PRN    traMADol (ULTRAM) 50 mg, oral, Every 6 hours PRN    traZODone (DESYREL) 100 mg, oral, Nightly PRN        RX Allergies[2]    Review of Systems   Constitutional:  Negative for appetite change, fatigue and unexpected weight change.   HENT:   Negative  for hearing loss.    Eyes:  Negative for eye problems.   Respiratory:  Negative for cough and shortness of breath.    Cardiovascular:  Negative for chest pain and leg swelling.   Gastrointestinal:  Negative for abdominal pain, constipation, diarrhea, nausea and vomiting.   Genitourinary:  Negative for difficulty urinating.    Musculoskeletal:  Negative for arthralgias and back pain.   Skin:  Negative for rash.   Neurological:  Negative for dizziness and headaches.   Hematological:  Negative for adenopathy.        Objective   BSA: 1.4 meters squared  /68 (BP Location: Left arm, Patient Position: Sitting, BP Cuff Size: Small adult)   Pulse 77   Temp 36.2 °C (97.2 °F) (Skin)   Resp 14   Wt 45.1 kg (99 lb 6.4 oz)   SpO2 98%   BMI 18.18 kg/m²   Performance Status:  (0) Fully active, able to carry on all predisease performance without restriction    Wt Readings from Last 5 Encounters:   07/02/25 45.1 kg (99 lb 6.4 oz)   06/20/25 45.2 kg (99 lb 9.6 oz)   06/11/25 46 kg (101 lb 8 oz)   05/28/25 (!) 44.7 kg (98 lb 9.6 oz)   05/20/25 46.9 kg (103 lb 6.4 oz)     \  Physical Exam  Vitals and nursing note reviewed.   Constitutional:       General: She is not in acute distress.  HENT:      Head: Normocephalic and atraumatic.   Eyes:      Pupils: Pupils are equal, round, and reactive to light.   Cardiovascular:      Rate and Rhythm: Normal rate and regular rhythm.      Heart sounds: Normal heart sounds.   Pulmonary:      Effort: Pulmonary effort is normal.      Breath sounds: Normal breath sounds.   Abdominal:      General: There is no distension.   Musculoskeletal:         General: No swelling.   Skin:     General: Skin is warm and dry.      Findings: No rash.   Neurological:      General: No focal deficit present.      Mental Status: She is alert and oriented to person, place, and time.   Psychiatric:         Mood and Affect: Mood normal.         Behavior: Behavior normal.          Results:  Labs:  Lab Results  "  Component Value Date    WBC 8.4 05/15/2025    HGB 13.4 05/15/2025    HCT 42.1 05/15/2025    MCV 93 05/15/2025     05/15/2025      No results found for: \"NEUTROABS\"   Lab Results   Component Value Date    GLUCOSE 100 (H) 05/15/2025    CALCIUM 9.3 05/15/2025     (L) 05/15/2025    K 4.5 05/15/2025    CO2 30 05/15/2025    CL 97 (L) 05/15/2025    BUN 11 05/15/2025    CREATININE 0.66 05/15/2025     No results found for: \"ALT\", \"AST\", \"GGT\", \"ALKPHOS\", \"BILITOT\"     Imaging:  No new imaging    Pathology:  No new pathology      Assessment/Plan      Lilly Nina is a 65 y.o. female here for follow-up of [id number], stage Ib squamous cell carcinoma of the lung, with PD-L1 100% and no actionable mutations on NGS.    Reviewed overall workup to date with patient previously, including findings from surgery showing stage Ib NSCLC. We discussed implications of stage IB disease in terms of overall risk of recurrence. We discussed that for stage II and higher disease, adjuvant chemotherapy has been shown to reduce the risk of recurrent disease and improve overall survival (about 5-10% improvement in overall survival which is stage dependent). For stage IA disease however, adjuvant chemotherapy has been shown to be detrimental. Data more limited on the role for stage IB disease but can be considered for those with high risk features. Primary benefit in studies that included IB disease was largely driven by those with >4cm tumors, and hers is exactly 4cm. Tumors of 4cm or higher were included in the recent adjuvant immunotherapy trials.    We reviewed that adjuvant immunotherapy to-date is approved and studied after chemotherapy, and we do not have data on this outside of that setting.     I did recommend consideration of adjuvant chemotherapy followed by immunotherapy given 4cm tumor with other high risk features including poorly differentiated histology and LVI.    Options for adjuvant therapy include " cisplatin/docetaxel, cisplatin/gemcitabine or carboplatin/paclitaxel. Survival benefit largely seen in the past with cisplatin based regimens. Discussed anticipated side effects. Discussed role of immunotherapy    - Consent signed today for cisplatin/gemcitabine  - Plan zyprexa nightly x4 nights with chemo  - PRN zofran/compazine  - dex 8mg daily x3 days after chemo D1  - Discussed ok to move/delay cycle of treatment if needed to accommodate her upcoming travel plans (3rd week of August)  - She prefers visits here but treatment in [place], discussed we do need to see her in person for chemo tox checks, for immunotherapy in the future can consider transition to virtual visits  - CT scan q6m, scheduled in Nov per Dr. Morrow    She prefers to call and schedule treatment when she knows her schedule  RTC for treatment    Michelle Maynard MD  [facility]           [1]   Family History  Problem Relation Name Age of Onset    Hypertension Mother      Hyperlipidemia Mother      Arthritis Mother Shirley Taiwo    [2] No Known Allergies

## 2025-07-08 ENCOUNTER — SOCIAL WORK (OUTPATIENT)
Dept: CASE MANAGEMENT | Facility: HOSPITAL | Age: 65
End: 2025-07-08
Payer: COMMERCIAL

## 2025-07-08 NOTE — PROGRESS NOTES
This SW is sending a letter to patient tomorrow to introduce herself and inform her how social work can assist. SW to follow as needed.

## 2025-07-10 ENCOUNTER — APPOINTMENT (OUTPATIENT)
Dept: HEMATOLOGY/ONCOLOGY | Facility: CLINIC | Age: 65
End: 2025-07-10
Payer: COMMERCIAL

## 2025-07-11 ENCOUNTER — APPOINTMENT (OUTPATIENT)
Dept: HEMATOLOGY/ONCOLOGY | Facility: HOSPITAL | Age: 65
End: 2025-07-11
Payer: COMMERCIAL

## 2025-07-15 ENCOUNTER — TELEPHONE (OUTPATIENT)
Dept: ADMISSION | Facility: HOSPITAL | Age: 65
End: 2025-07-15
Payer: COMMERCIAL

## 2025-07-15 NOTE — TELEPHONE ENCOUNTER
Pt saw her dentist yesterday and has an infected tooth.  She will be starting antibiotics today and has an appt with an oral surgeon 7/21 to determine if she needs a root canal.  Its not an urgent issue but she is having some tooth pain.  If dental work is needed, will she be able to have this done during treatment?    She also asked about safe handling of body fluids during treatment.  Reviewed with pt and PI sheet emailed to her for reference.

## 2025-07-16 ENCOUNTER — TELEPHONE (OUTPATIENT)
Dept: ADMISSION | Facility: HOSPITAL | Age: 65
End: 2025-07-16
Payer: COMMERCIAL

## 2025-07-16 ENCOUNTER — LAB (OUTPATIENT)
Dept: LAB | Facility: HOSPITAL | Age: 65
End: 2025-07-16
Payer: COMMERCIAL

## 2025-07-16 DIAGNOSIS — C34.92 MALIGNANT NEOPLASM OF UNSPECIFIED PART OF LEFT BRONCHUS OR LUNG (MULTI): Primary | ICD-10-CM

## 2025-07-16 DIAGNOSIS — C34.92 NSCLC OF LEFT LUNG (MULTI): ICD-10-CM

## 2025-07-16 LAB
ALBUMIN SERPL BCP-MCNC: 4.2 G/DL (ref 3.4–5)
ALP SERPL-CCNC: 87 U/L (ref 33–136)
ALT SERPL W P-5'-P-CCNC: 10 U/L (ref 7–45)
ANION GAP SERPL CALC-SCNC: 11 MMOL/L (ref 10–20)
AST SERPL W P-5'-P-CCNC: 15 U/L (ref 9–39)
BASOPHILS # BLD AUTO: 0.08 X10*3/UL (ref 0–0.1)
BASOPHILS NFR BLD AUTO: 0.8 %
BILIRUB SERPL-MCNC: 0.3 MG/DL (ref 0–1.2)
BUN SERPL-MCNC: 12 MG/DL (ref 6–23)
CALCIUM SERPL-MCNC: 9.4 MG/DL (ref 8.6–10.6)
CHLORIDE SERPL-SCNC: 100 MMOL/L (ref 98–107)
CO2 SERPL-SCNC: 30 MMOL/L (ref 21–32)
CREAT SERPL-MCNC: 0.69 MG/DL (ref 0.5–1.05)
EGFRCR SERPLBLD CKD-EPI 2021: >90 ML/MIN/1.73M*2
EOSINOPHIL # BLD AUTO: 0.2 X10*3/UL (ref 0–0.7)
EOSINOPHIL NFR BLD AUTO: 2.1 %
ERYTHROCYTE [DISTWIDTH] IN BLOOD BY AUTOMATED COUNT: 14 % (ref 11.5–14.5)
GLUCOSE SERPL-MCNC: 100 MG/DL (ref 74–99)
HBV CORE AB SER QL: NONREACTIVE
HBV SURFACE AB SER-ACNC: <3.1 MIU/ML
HBV SURFACE AG SERPL QL IA: NONREACTIVE
HCT VFR BLD AUTO: 42.4 % (ref 36–46)
HGB BLD-MCNC: 13.6 G/DL (ref 12–16)
IMM GRANULOCYTES # BLD AUTO: 0.04 X10*3/UL (ref 0–0.7)
IMM GRANULOCYTES NFR BLD AUTO: 0.4 % (ref 0–0.9)
LYMPHOCYTES # BLD AUTO: 3.51 X10*3/UL (ref 1.2–4.8)
LYMPHOCYTES NFR BLD AUTO: 37.2 %
MAGNESIUM SERPL-MCNC: 2 MG/DL (ref 1.6–2.4)
MCH RBC QN AUTO: 30.6 PG (ref 26–34)
MCHC RBC AUTO-ENTMCNC: 32.1 G/DL (ref 32–36)
MCV RBC AUTO: 96 FL (ref 80–100)
MONOCYTES # BLD AUTO: 0.51 X10*3/UL (ref 0.1–1)
MONOCYTES NFR BLD AUTO: 5.4 %
NEUTROPHILS # BLD AUTO: 5.09 X10*3/UL (ref 1.2–7.7)
NEUTROPHILS NFR BLD AUTO: 54.1 %
NRBC BLD-RTO: 0 /100 WBCS (ref 0–0)
PLATELET # BLD AUTO: 335 X10*3/UL (ref 150–450)
POTASSIUM SERPL-SCNC: 4.2 MMOL/L (ref 3.5–5.3)
PROT SERPL-MCNC: 6.4 G/DL (ref 6.4–8.2)
RBC # BLD AUTO: 4.44 X10*6/UL (ref 4–5.2)
SODIUM SERPL-SCNC: 137 MMOL/L (ref 136–145)
WBC # BLD AUTO: 9.4 X10*3/UL (ref 4.4–11.3)

## 2025-07-16 PROCEDURE — 36415 COLL VENOUS BLD VENIPUNCTURE: CPT

## 2025-07-16 PROCEDURE — 85025 COMPLETE CBC W/AUTO DIFF WBC: CPT

## 2025-07-16 PROCEDURE — 86704 HEP B CORE ANTIBODY TOTAL: CPT

## 2025-07-16 PROCEDURE — 86706 HEP B SURFACE ANTIBODY: CPT

## 2025-07-16 PROCEDURE — 87340 HEPATITIS B SURFACE AG IA: CPT

## 2025-07-16 PROCEDURE — 80053 COMPREHEN METABOLIC PANEL: CPT

## 2025-07-16 PROCEDURE — 83735 ASSAY OF MAGNESIUM: CPT

## 2025-07-16 RX ORDER — HEPARIN SODIUM,PORCINE/PF 10 UNIT/ML
50 SYRINGE (ML) INTRAVENOUS AS NEEDED
Status: CANCELLED | OUTPATIENT
Start: 2025-07-16

## 2025-07-16 RX ORDER — HEPARIN 100 UNIT/ML
500 SYRINGE INTRAVENOUS AS NEEDED
Status: CANCELLED | OUTPATIENT
Start: 2025-07-16

## 2025-07-16 NOTE — TELEPHONE ENCOUNTER
Discussed with pt that she should let us know after her appt with oral surgeon what intervention is needed.  For anything more than a cleaning we need to time around her chemo to make sure that blood counts are acceptable for the procedure.  She verbalized understanding.

## 2025-07-16 NOTE — TELEPHONE ENCOUNTER
Patient starts treatment on Friday, asking if ok to wear nicotine patch  Secure chat sent to team  Per team, ok to wear nicotine patch during treatment    Patient updated, verbalized understanding, no further needs at this time

## 2025-07-17 LAB — HOLD SPECIMEN: NORMAL

## 2025-07-18 ENCOUNTER — SOCIAL WORK (OUTPATIENT)
Dept: HEMATOLOGY/ONCOLOGY | Facility: CLINIC | Age: 65
End: 2025-07-18
Payer: COMMERCIAL

## 2025-07-18 ENCOUNTER — INFUSION (OUTPATIENT)
Dept: HEMATOLOGY/ONCOLOGY | Facility: CLINIC | Age: 65
End: 2025-07-18
Payer: COMMERCIAL

## 2025-07-18 VITALS
TEMPERATURE: 98.4 F | WEIGHT: 102.51 LBS | OXYGEN SATURATION: 96 % | BODY MASS INDEX: 18.75 KG/M2 | DIASTOLIC BLOOD PRESSURE: 95 MMHG | SYSTOLIC BLOOD PRESSURE: 158 MMHG | RESPIRATION RATE: 16 BRPM | HEART RATE: 88 BPM

## 2025-07-18 DIAGNOSIS — C34.92 NSCLC OF LEFT LUNG (MULTI): ICD-10-CM

## 2025-07-18 PROCEDURE — 96367 TX/PROPH/DG ADDL SEQ IV INF: CPT

## 2025-07-18 PROCEDURE — 2500000004 HC RX 250 GENERAL PHARMACY W/ HCPCS (ALT 636 FOR OP/ED): Performed by: INTERNAL MEDICINE

## 2025-07-18 PROCEDURE — 96360 HYDRATION IV INFUSION INIT: CPT | Mod: INF

## 2025-07-18 RX ORDER — HEPARIN SODIUM,PORCINE/PF 10 UNIT/ML
50 SYRINGE (ML) INTRAVENOUS AS NEEDED
OUTPATIENT
Start: 2025-07-18

## 2025-07-18 RX ORDER — HEPARIN 100 UNIT/ML
500 SYRINGE INTRAVENOUS AS NEEDED
OUTPATIENT
Start: 2025-07-18

## 2025-07-18 RX ORDER — PALONOSETRON 0.05 MG/ML
0.25 INJECTION, SOLUTION INTRAVENOUS ONCE
Status: COMPLETED | OUTPATIENT
Start: 2025-07-18 | End: 2025-07-18

## 2025-07-18 RX ORDER — AMOXICILLIN 500 MG/1
CAPSULE ORAL
COMMUNITY

## 2025-07-18 RX ADMIN — DEXAMETHASONE SODIUM PHOSPHATE 12 MG: 10 INJECTION, SOLUTION INTRAMUSCULAR; INTRAVENOUS at 11:48

## 2025-07-18 RX ADMIN — GEMCITABINE 1390.8 MG: 38 INJECTION, SOLUTION INTRAVENOUS at 12:46

## 2025-07-18 RX ADMIN — FOSAPREPITANT 150 MG: 150 INJECTION, POWDER, LYOPHILIZED, FOR SOLUTION INTRAVENOUS at 12:07

## 2025-07-18 RX ADMIN — PALONOSETRON 0.25 MG: 0.05 INJECTION, SOLUTION INTRAVENOUS at 11:47

## 2025-07-18 RX ADMIN — POTASSIUM CHLORIDE 999 ML/HR: 2 INJECTION, SOLUTION, CONCENTRATE INTRAVENOUS at 10:32

## 2025-07-18 ASSESSMENT — PAIN SCALES - GENERAL: PAINLEVEL_OUTOF10: 0-NO PAIN

## 2025-07-18 NOTE — SIGNIFICANT EVENT
07/18/25 0939   Prechemo Checklist   Has the patient been in the hospital, ED, or urgent care since last date of service N/A   Chemo/Immuno Consent Completed and Signed Yes  (6/11/2025)   Protocol/Indications Verified Yes   Confirmed to previous date/time of medication N/A   Compared to previous dose N/A   All medications are dated accurately Yes   Pregnancy Test Negative Not applicable   Parameters Met (!) No   Reasons Parameters Not Met Creatinine Clearance  (CrCl 57.5mL/mn)   Provider Notified Yes   Provider Name Michelle Maynard MD   Is Patient Proceeding With Treatment? Yes   BSA/Weight-Height Verified Yes   Dose Calculations Verified (current, total, cumulative) Yes
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
## 2025-07-23 ENCOUNTER — TELEMEDICINE (OUTPATIENT)
Dept: HEMATOLOGY/ONCOLOGY | Facility: HOSPITAL | Age: 65
End: 2025-07-23
Payer: COMMERCIAL

## 2025-07-23 DIAGNOSIS — C34.92 NSCLC OF LEFT LUNG (MULTI): Primary | ICD-10-CM

## 2025-07-23 PROCEDURE — 99214 OFFICE O/P EST MOD 30 MIN: CPT | Performed by: NURSE PRACTITIONER

## 2025-07-23 RX ORDER — LIDOCAINE AND PRILOCAINE 25; 25 MG/G; MG/G
CREAM TOPICAL ONCE
Qty: 30 G | Refills: 2 | Status: SHIPPED | OUTPATIENT
Start: 2025-07-23 | End: 2025-07-26

## 2025-07-23 ASSESSMENT — ENCOUNTER SYMPTOMS
LEG SWELLING: 0
HEADACHES: 0
UNEXPECTED WEIGHT CHANGE: 0
SHORTNESS OF BREATH: 0
DIZZINESS: 0
APPETITE CHANGE: 0
DIFFICULTY URINATING: 0
CONSTIPATION: 0
COUGH: 0
ABDOMINAL PAIN: 0
NAUSEA: 0
ADENOPATHY: 0
FATIGUE: 0
DIARRHEA: 0
BACK PAIN: 0
VOMITING: 0
ARTHRALGIAS: 0
EYE PROBLEMS: 0

## 2025-07-23 NOTE — PROGRESS NOTES
[facility] - Medical Oncology Follow-Up Visit    Patient ID: Lilly Nina is a 65 y.o. female.  Referring Physician: Michelle Maynard MD  [id number] [place] [address]  Primary Care Provider: MARGARET Hernandez-CNP      Chief Concern: Patient is here to follow-up     HPI     Virtual or Telephone Consent    An interactive audio and video telecommunication system which permits real time communications between the patient (at the originating site) and provider (at the distant site) was utilized to provide this telehealth service.   Verbal consent was requested and obtained from Lilly Nina on this date, 07/23/25 for a telehealth visit and the patient's location was confirmed at the time of the visit.     Pt present for follow-up.  At time of infusion visit, secure chat message received from infusion nurse.  Shortly after initiating gemcitabine infusion, pt verbalized a severe burning sensation.  Nurse attempted to restart gemcitabine with secondary NS.  Pt reported continued burning sensation. Infusion stopped.  Sensation stopped with NS infusion.  IV patent.  Pt declined 2nd IV attempt.  She did not receive her cisplatin infusion. Treatment was stopped.      Today pt verbalized she wishes to continue with current treatment plan.  She is agreeable to mediport placement.  No other concerns at time of call.      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 Few subcm nodules bilaterally. Small  pericardial effusion  3/26/25 - PET with uptake in LLL mass  4/21/25- Bronch/EBUS for staging with biopsy of LLL mass with NSCLC (squamous), level 7, 4L, 11L nodes neg. Endobronchial tracheal biopsy negative for malignancy (?representative)  4/28/25 - MRI brain negative for metastatic disease  5/13/25 - left lower lobectomy with poorly differentiated nonkeratinizing SCC. Nodes negative, 4cm tumor, no NADIA, VPI, +LVI, margins neg    Past Medical History:  No date: COPD (chronic obstructive pulmonary disease) (Multi)  01/12/2022: Encounter for screening for other disorder      Comment:  Screening for blood or protein in urine  No date: Hypertension  03/2025: Lung cancer, lower lobe (Multi)     Past Surgical History:  05/13/2025: LUNG REMOVAL, PARTIAL; Left      Comment:  Robotic assisted LLL lobectomy, MLND [84739 (CPT®)] Left  09/09/2021: OTHER SURGICAL HISTORY      Comment:  Hip replacement  09/09/2021: OTHER SURGICAL HISTORY      Comment:  Hip replacement  09/09/2021: OTHER SURGICAL HISTORY      Comment:  Carpal tunnel surgery  09/09/2021: OTHER SURGICAL HISTORY      Comment:  Trigger finger repair     Social Hx:  Lilly Nina  reports that she has been smoking cigarettes. She started smoking about 51 years ago. She has a 51.5 pack-year smoking history. She has been exposed to tobacco smoke. She has never used smokeless tobacco.  She  reports current alcohol use of about 2.0 standard drinks of alcohol per week.  She  reports no history of drug use.    Still smokes - normal 1ppd, down to <0.5ppd, using patch   Works for [employer], senior programs  Working with tobacco cessation    Family History[1]   No family hx of cancer    Meds (Current):  Current Outpatient Medications   Medication Instructions    acetaminophen (TYLENOL) 650 mg, oral, Every 6 hours    amoxicillin (Amoxil) 500 mg capsule Take by mouth. For tooth infection    ascorbic acid (Vitamin C) 1,000 mg tablet 1 tablet, Daily    atorvastatin (LIPITOR)  20 mg, oral, Daily    cholecalciferol (Vitamin D-3) 50 mcg (2,000 unit) capsule 1 capsule, Daily    cyanocobalamin (VITAMIN B-12) 1,000 mcg, Daily    dexAMETHasone (DECADRON) 8 mg, oral, Daily, For 3 days starting the day after treatment    doxylamine (Unisom, doxylamine,) 25 mg tablet 1 tablet, Nightly    ibuprofen 600 mg, oral, Every 6 hours scheduled    lidocaine (Lidoderm) 5 % patch 1 patch, transdermal, Every 12 hours, Remove & discard patch within 12 hours or as directed by MD.    lisinopril 5 mg, oral, Daily    nicotine (Nicoderm CQ) 14 mg/24 hr patch 1 patch, transdermal, Every 24 hours    OLANZapine (ZYPREXA) 5 mg, oral, Nightly, For 4 days starting the evening of treatment    ondansetron (ZOFRAN) 8 mg, oral, Every 8 hours PRN    prochlorperazine (COMPAZINE) 10 mg, oral, Every 6 hours PRN    traMADol (ULTRAM) 50 mg, oral, Every 6 hours PRN    traZODone (DESYREL) 100 mg, oral, Nightly PRN        RX Allergies[2]    Review of Systems   Constitutional:  Negative for appetite change, fatigue and unexpected weight change.   HENT:   Negative for hearing loss.    Eyes:  Negative for eye problems.   Respiratory:  Negative for cough and shortness of breath.    Cardiovascular:  Negative for chest pain and leg swelling.   Gastrointestinal:  Negative for abdominal pain, constipation, diarrhea, nausea and vomiting.   Genitourinary:  Negative for difficulty urinating.    Musculoskeletal:  Negative for arthralgias and back pain.   Skin:  Negative for rash.   Neurological:  Negative for dizziness and headaches.   Hematological:  Negative for adenopathy.        Objective   BSA: There is no height or weight on file to calculate BSA.  There were no vitals taken for this visit.  Performance Status:  (0) Fully active, able to carry on all predisease performance without restriction    Wt Readings from Last 5 Encounters:   07/18/25 46.5 kg (102 lb 8.2 oz)   07/02/25 45.1 kg (99 lb 6.4 oz)   06/20/25 45.2 kg (99 lb 9.6 oz)    06/11/25 46 kg (101 lb 8 oz)   05/28/25 (!) 44.7 kg (98 lb 9.6 oz)     \  Physical Exam  Constitutional:       General: She is not in acute distress.     Appearance: Normal appearance.   HENT:      Head: Normocephalic and atraumatic.     Eyes:      Pupils: Pupils are equal, round, and reactive to light.     Pulmonary:      Effort: Pulmonary effort is normal.   Abdominal:      General: There is no distension.     Musculoskeletal:         General: No swelling.     Skin:     General: Skin is warm and dry.      Findings: No rash.     Neurological:      General: No focal deficit present.      Mental Status: She is alert and oriented to person, place, and time.     Psychiatric:         Mood and Affect: Mood normal.         Behavior: Behavior normal.          Results:  Labs:  Lab Results   Component Value Date    WBC 9.4 07/16/2025    HGB 13.6 07/16/2025    HCT 42.4 07/16/2025    MCV 96 07/16/2025     07/16/2025      Lab Results   Component Value Date    NEUTROABS 5.09 07/16/2025      Lab Results   Component Value Date    GLUCOSE 100 (H) 07/16/2025    CALCIUM 9.4 07/16/2025     07/16/2025    K 4.2 07/16/2025    CO2 30 07/16/2025     07/16/2025    BUN 12 07/16/2025    CREATININE 0.69 07/16/2025     Lab Results   Component Value Date    ALT 10 07/16/2025    AST 15 07/16/2025    ALKPHOS 87 07/16/2025    BILITOT 0.3 07/16/2025        Imaging:  No new imaging    Pathology:  No new pathology      Assessment/Plan      Lilly Nina is a 65 y.o. female here for follow-up of [id number], stage Ib squamous cell carcinoma of the lung, with PD-L1 100% and no actionable mutations on NGS.    Reviewed overall workup to date with patient previously, including findings from surgery showing stage Ib NSCLC. We discussed implications of stage IB disease in terms of overall risk of recurrence. We discussed that for stage II and higher disease, adjuvant chemotherapy has been shown to reduce the risk of recurrent disease  and improve overall survival (about 5-10% improvement in overall survival which is stage dependent). For stage IA disease however, adjuvant chemotherapy has been shown to be detrimental. Data more limited on the role for stage IB disease but can be considered for those with high risk features. Primary benefit in studies that included IB disease was largely driven by those with >4cm tumors, and hers is exactly 4cm. Tumors of 4cm or higher were included in the recent adjuvant immunotherapy trials.    We reviewed that adjuvant immunotherapy to-date is approved and studied after chemotherapy, and we do not have data on this outside of that setting.     I did recommend consideration of adjuvant chemotherapy followed by immunotherapy given 4cm tumor with other high risk features including poorly differentiated histology and LVI.    Options for adjuvant therapy include cisplatin/docetaxel, cisplatin/gemcitabine or carboplatin/paclitaxel. Survival benefit largely seen in the past with cisplatin based regimens. Discussed anticipated side effects. Discussed role of immunotherapy    - Consent signed for cisplatin/gemcitabine  - Plan zyprexa nightly x4 nights with chemo  - PRN zofran/compazine  - dex 8mg daily x3 days after chemo D1  - Discussed ok to move/delay cycle of treatment if needed to accommodate her upcoming travel plans (3rd week of August)  - She prefers visits here but treatment in [place], discussed we do need to see her in person for chemo tox checks, for immunotherapy in the future can consider transition to virtual visits  - CT scan q6m, scheduled in Nov per Dr. Morrow  - initial infusion not tolerated via IV d/t severe burning sensation.  Infusion stopped.  She wishes to continue current treatment regimen.  Agreeable to mediport placement.  IR to contact pt for scheduling.  Will have INR lab drawn tomorrow.  Mediport placement scheduled 7/29/25.  C1D1 only with C1 - scheduled 8/1/25 at [place].  - C2 delayed one  week d/t pt trip (8/20 - 8/24/25).     RTC 8/27/25 for clinic visit, C2D1 8/29/25.             [1]   Family History  Problem Relation Name Age of Onset    Hypertension Mother      Hyperlipidemia Mother      Arthritis Mother Shirley Maravilla    [2] No Known Allergies

## 2025-07-24 ENCOUNTER — LAB (OUTPATIENT)
Dept: LAB | Facility: HOSPITAL | Age: 65
End: 2025-07-24
Payer: COMMERCIAL

## 2025-07-24 LAB
INR PPP: 0.9 (ref 0.9–1.1)
PROTHROMBIN TIME: 10.3 SECONDS (ref 9.8–12.4)

## 2025-07-24 PROCEDURE — 85610 PROTHROMBIN TIME: CPT

## 2025-07-25 ENCOUNTER — APPOINTMENT (OUTPATIENT)
Dept: HEMATOLOGY/ONCOLOGY | Facility: CLINIC | Age: 65
End: 2025-07-25
Payer: COMMERCIAL

## 2025-07-28 DIAGNOSIS — I10 BENIGN HYPERTENSION: ICD-10-CM

## 2025-07-28 RX ORDER — LISINOPRIL 5 MG/1
5 TABLET ORAL DAILY
Qty: 90 TABLET | Refills: 0 | Status: SHIPPED | OUTPATIENT
Start: 2025-07-28

## 2025-07-29 ENCOUNTER — HOSPITAL ENCOUNTER (OUTPATIENT)
Dept: RADIOLOGY | Facility: HOSPITAL | Age: 65
Discharge: HOME | End: 2025-07-29
Payer: COMMERCIAL

## 2025-07-29 VITALS
TEMPERATURE: 99 F | DIASTOLIC BLOOD PRESSURE: 56 MMHG | HEART RATE: 76 BPM | OXYGEN SATURATION: 100 % | SYSTOLIC BLOOD PRESSURE: 109 MMHG | RESPIRATION RATE: 16 BRPM

## 2025-07-29 DIAGNOSIS — C34.92 NSCLC OF LEFT LUNG (MULTI): ICD-10-CM

## 2025-07-29 PROCEDURE — 99153 MOD SED SAME PHYS/QHP EA: CPT

## 2025-07-29 PROCEDURE — 7100000009 HC PHASE TWO TIME - INITIAL BASE CHARGE

## 2025-07-29 PROCEDURE — 77001 FLUOROGUIDE FOR VEIN DEVICE: CPT | Performed by: RADIOLOGY

## 2025-07-29 PROCEDURE — 2780000003 HC OR 278 NO HCPCS

## 2025-07-29 PROCEDURE — 76937 US GUIDE VASCULAR ACCESS: CPT | Performed by: RADIOLOGY

## 2025-07-29 PROCEDURE — 99152 MOD SED SAME PHYS/QHP 5/>YRS: CPT

## 2025-07-29 PROCEDURE — C1788 PORT, INDWELLING, IMP: HCPCS

## 2025-07-29 PROCEDURE — 2500000004 HC RX 250 GENERAL PHARMACY W/ HCPCS (ALT 636 FOR OP/ED): Performed by: RADIOLOGY

## 2025-07-29 PROCEDURE — 36561 INSERT TUNNELED CV CATH: CPT | Mod: RT | Performed by: RADIOLOGY

## 2025-07-29 PROCEDURE — 7100000010 HC PHASE TWO TIME - EACH INCREMENTAL 1 MINUTE

## 2025-07-29 PROCEDURE — 99152 MOD SED SAME PHYS/QHP 5/>YRS: CPT | Performed by: RADIOLOGY

## 2025-07-29 PROCEDURE — 2500000004 HC RX 250 GENERAL PHARMACY W/ HCPCS (ALT 636 FOR OP/ED)

## 2025-07-29 RX ORDER — MIDAZOLAM HYDROCHLORIDE 2 MG/2ML
INJECTION, SOLUTION INTRAMUSCULAR; INTRAVENOUS
Status: COMPLETED | OUTPATIENT
Start: 2025-07-29 | End: 2025-07-29

## 2025-07-29 RX ORDER — FENTANYL CITRATE 50 UG/ML
INJECTION, SOLUTION INTRAMUSCULAR; INTRAVENOUS
Status: COMPLETED | OUTPATIENT
Start: 2025-07-29 | End: 2025-07-29

## 2025-07-29 RX ORDER — CEFAZOLIN SODIUM 2 G/50ML
2 SOLUTION INTRAVENOUS ONCE
Status: COMPLETED | OUTPATIENT
Start: 2025-07-29 | End: 2025-07-29

## 2025-07-29 RX ADMIN — MIDAZOLAM HYDROCHLORIDE 1 MG: 2 INJECTION, SOLUTION INTRAMUSCULAR; INTRAVENOUS at 12:15

## 2025-07-29 RX ADMIN — FENTANYL CITRATE 50 MCG: 50 INJECTION, SOLUTION INTRAMUSCULAR; INTRAVENOUS at 12:15

## 2025-07-29 RX ADMIN — CEFAZOLIN SODIUM 2 G: 2 SOLUTION INTRAVENOUS at 12:10

## 2025-07-29 ASSESSMENT — PAIN SCALES - GENERAL

## 2025-07-29 NOTE — PRE-PROCEDURE NOTE
Interventional Radiology Preprocedure Note    Indication for procedure: The encounter diagnosis was NSCLC of left lung (Multi).    Relevant review of systems: NA    Relevant Labs:   Lab Results   Component Value Date    CREATININE 0.69 07/16/2025    EGFR >90 07/16/2025    INR 0.9 07/24/2025    PROTIME 10.3 07/24/2025       Planned Sedation/Anesthesia: Moderate    Airway assessment: normal    Directed physical examination:    Alert and oriented  Normal of work of breathing on room air  No acute distress    Mallampati: I (soft palate, uvula, fauces, and tonsillar pillars visible)    ASA Score: ASA 2 - Patient with mild systemic disease with no functional limitations    Benefits, risks and alternatives of procedure and planned sedation have been discussed with the patient and/or their representative. All questions answered and they agree to proceed.     Reviewed and approved by JW BENSON on 7/29/25 at 11:26 AM.

## 2025-07-29 NOTE — POST-PROCEDURE NOTE
Interventional Radiology Brief Postprocedure Note    Attending: Marshall Marcos MD    Assistant:   Staff Role   Charan Valenzuela, RN Radiology Nurse   Marshall Marcos MD Radiologist       Diagnosis:   1. NSCLC of left lung (Multi)  IR CVC port placement    IR CVC port placement          Description of procedure: right chest port placement.    Timeout:  Yes    Procedure Area: Procedure Area     Anesthesia:   Conscious Sedation    Complications: None    Estimated Blood Loss: minimal    Medications (Filter: Administrations occurring from 1159 to 1244 on 07/29/25) As of 07/29/25 1244      ceFAZolin (Ancef) 2 g in dextrose (iso) IV 50 mL (mL/hr) Total volume:  Not documented*   *Total volume has not been documented. View each administration to see the amount administered.     Date/Time Rate/Dose/Volume Action       07/29/25  1210 2 g - 100 mL/hr (over 30 min) New Bag               fentaNYL PF (Sublimaze) injection (mcg) Total dose:  50 mcg      Date/Time Rate/Dose/Volume Action       07/29/25  1215 50 mcg Given               midazolam (Versed) injection (mg) Total dose:  1 mg      Date/Time Rate/Dose/Volume Action       07/29/25  1215 1 mg Given                   No specimens collected      See detailed result report with images in PACS.    The patient tolerated the procedure well without incident or complication and is in stable condition.

## 2025-07-29 NOTE — DISCHARGE INSTRUCTIONS
Follow instructions on [facility] patient info sheet.    Remove bandage in 24 hrs. Ok to shower in 24 hrs. Do not scrub off surgical glue or steri-strips let them fall off on their own which will take 10-14 days. Call for signs of infection. No heavy lifting, pushing or pulling more than 10 lbs for 2-3 days.    You received moderate sedation:  - Do not drive a car, or operate any machinery or power tools of any kind.  - Do not drink any alcoholic drinks.  - Do not take any over the counter medications that may cause drowsiness.  - Do not make any important decisions or sign any legal documents.  - You need to have a responsible adult accompany you home.  - You may resume your normal diet.  - We strongly suggest that a responsible adult be with you for the rest of the day and also during the night. This is for your protection and safety.     For questions related to your procedure:  Please call [phone] between the hours of 7:00am-5:00pm Monday through Friday.  Please call [phone] after 5:00pm and on weekends and holidays.     In the event of an emergency call 911 or go to your nearest emergency room.

## 2025-07-31 ENCOUNTER — INFUSION (OUTPATIENT)
Dept: HEMATOLOGY/ONCOLOGY | Facility: CLINIC | Age: 65
End: 2025-07-31
Payer: COMMERCIAL

## 2025-07-31 DIAGNOSIS — C34.92 NSCLC OF LEFT LUNG (MULTI): ICD-10-CM

## 2025-07-31 LAB
ALBUMIN SERPL BCP-MCNC: 4.3 G/DL (ref 3.4–5)
ALP SERPL-CCNC: 75 U/L (ref 33–136)
ALT SERPL W P-5'-P-CCNC: 8 U/L (ref 7–45)
ANION GAP SERPL CALC-SCNC: 12 MMOL/L (ref 10–20)
AST SERPL W P-5'-P-CCNC: 16 U/L (ref 9–39)
BASOPHILS # BLD AUTO: 0.03 X10*3/UL (ref 0–0.1)
BASOPHILS NFR BLD AUTO: 0.3 %
BILIRUB SERPL-MCNC: 0.3 MG/DL (ref 0–1.2)
BUN SERPL-MCNC: 9 MG/DL (ref 6–23)
CALCIUM SERPL-MCNC: 9.2 MG/DL (ref 8.6–10.3)
CHLORIDE SERPL-SCNC: 103 MMOL/L (ref 98–107)
CO2 SERPL-SCNC: 26 MMOL/L (ref 21–32)
CREAT SERPL-MCNC: 0.58 MG/DL (ref 0.5–1.05)
EGFRCR SERPLBLD CKD-EPI 2021: >90 ML/MIN/1.73M*2
EOSINOPHIL # BLD AUTO: 0.18 X10*3/UL (ref 0–0.7)
EOSINOPHIL NFR BLD AUTO: 2 %
ERYTHROCYTE [DISTWIDTH] IN BLOOD BY AUTOMATED COUNT: 14.3 % (ref 11.5–14.5)
GLUCOSE SERPL-MCNC: 77 MG/DL (ref 74–99)
HCT VFR BLD AUTO: 40.5 % (ref 36–46)
HGB BLD-MCNC: 13.3 G/DL (ref 12–16)
IMM GRANULOCYTES # BLD AUTO: 0.04 X10*3/UL (ref 0–0.7)
IMM GRANULOCYTES NFR BLD AUTO: 0.4 % (ref 0–0.9)
LYMPHOCYTES # BLD AUTO: 3.52 X10*3/UL (ref 1.2–4.8)
LYMPHOCYTES NFR BLD AUTO: 38.6 %
MAGNESIUM SERPL-MCNC: 1.9 MG/DL (ref 1.6–2.4)
MCH RBC QN AUTO: 30.4 PG (ref 26–34)
MCHC RBC AUTO-ENTMCNC: 32.8 G/DL (ref 32–36)
MCV RBC AUTO: 93 FL (ref 80–100)
MONOCYTES # BLD AUTO: 0.62 X10*3/UL (ref 0.1–1)
MONOCYTES NFR BLD AUTO: 6.8 %
NEUTROPHILS # BLD AUTO: 4.74 X10*3/UL (ref 1.2–7.7)
NEUTROPHILS NFR BLD AUTO: 51.9 %
NRBC BLD-RTO: 0 /100 WBCS (ref 0–0)
PLATELET # BLD AUTO: 242 X10*3/UL (ref 150–450)
POTASSIUM SERPL-SCNC: 4.1 MMOL/L (ref 3.5–5.3)
PROT SERPL-MCNC: 6.7 G/DL (ref 6.4–8.2)
RBC # BLD AUTO: 4.38 X10*6/UL (ref 4–5.2)
SODIUM SERPL-SCNC: 137 MMOL/L (ref 136–145)
WBC # BLD AUTO: 9.1 X10*3/UL (ref 4.4–11.3)

## 2025-07-31 PROCEDURE — 96523 IRRIG DRUG DELIVERY DEVICE: CPT

## 2025-07-31 PROCEDURE — 36591 DRAW BLOOD OFF VENOUS DEVICE: CPT

## 2025-07-31 PROCEDURE — 83735 ASSAY OF MAGNESIUM: CPT

## 2025-07-31 PROCEDURE — 2500000004 HC RX 250 GENERAL PHARMACY W/ HCPCS (ALT 636 FOR OP/ED): Performed by: NURSE PRACTITIONER

## 2025-07-31 PROCEDURE — 84075 ASSAY ALKALINE PHOSPHATASE: CPT

## 2025-07-31 PROCEDURE — 85025 COMPLETE CBC W/AUTO DIFF WBC: CPT

## 2025-07-31 RX ORDER — HEPARIN 100 UNIT/ML
500 SYRINGE INTRAVENOUS AS NEEDED
Status: DISCONTINUED | OUTPATIENT
Start: 2025-07-31 | End: 2025-07-31 | Stop reason: HOSPADM

## 2025-07-31 RX ORDER — HEPARIN 100 UNIT/ML
500 SYRINGE INTRAVENOUS AS NEEDED
Status: CANCELLED | OUTPATIENT
Start: 2025-07-31

## 2025-07-31 RX ORDER — HEPARIN SODIUM,PORCINE/PF 10 UNIT/ML
50 SYRINGE (ML) INTRAVENOUS AS NEEDED
Status: CANCELLED | OUTPATIENT
Start: 2025-07-31

## 2025-07-31 RX ORDER — HEPARIN SODIUM,PORCINE/PF 10 UNIT/ML
50 SYRINGE (ML) INTRAVENOUS AS NEEDED
Status: DISCONTINUED | OUTPATIENT
Start: 2025-07-31 | End: 2025-07-31 | Stop reason: HOSPADM

## 2025-07-31 RX ORDER — PALONOSETRON 0.05 MG/ML
0.25 INJECTION, SOLUTION INTRAVENOUS ONCE
Status: CANCELLED | OUTPATIENT
Start: 2025-07-31

## 2025-07-31 RX ADMIN — HEPARIN 500 UNITS: 100 SYRINGE at 13:40

## 2025-08-01 ENCOUNTER — INFUSION (OUTPATIENT)
Dept: HEMATOLOGY/ONCOLOGY | Facility: CLINIC | Age: 65
End: 2025-08-01
Payer: COMMERCIAL

## 2025-08-01 VITALS
WEIGHT: 102.73 LBS | BODY MASS INDEX: 18.79 KG/M2 | TEMPERATURE: 97.3 F | OXYGEN SATURATION: 96 % | SYSTOLIC BLOOD PRESSURE: 134 MMHG | HEART RATE: 81 BPM | DIASTOLIC BLOOD PRESSURE: 64 MMHG | RESPIRATION RATE: 16 BRPM

## 2025-08-01 DIAGNOSIS — C34.92 NSCLC OF LEFT LUNG (MULTI): ICD-10-CM

## 2025-08-01 PROCEDURE — 96360 HYDRATION IV INFUSION INIT: CPT | Mod: INF

## 2025-08-01 PROCEDURE — 96374 THER/PROPH/DIAG INJ IV PUSH: CPT | Mod: INF

## 2025-08-01 PROCEDURE — 96417 CHEMO IV INFUS EACH ADDL SEQ: CPT

## 2025-08-01 PROCEDURE — 96368 THER/DIAG CONCURRENT INF: CPT

## 2025-08-01 PROCEDURE — 96413 CHEMO IV INFUSION 1 HR: CPT

## 2025-08-01 PROCEDURE — 96365 THER/PROPH/DIAG IV INF INIT: CPT | Mod: INF

## 2025-08-01 PROCEDURE — 2500000004 HC RX 250 GENERAL PHARMACY W/ HCPCS (ALT 636 FOR OP/ED): Performed by: INTERNAL MEDICINE

## 2025-08-01 PROCEDURE — 2500000004 HC RX 250 GENERAL PHARMACY W/ HCPCS (ALT 636 FOR OP/ED): Performed by: NURSE PRACTITIONER

## 2025-08-01 PROCEDURE — 96523 IRRIG DRUG DELIVERY DEVICE: CPT

## 2025-08-01 PROCEDURE — 96375 TX/PRO/DX INJ NEW DRUG ADDON: CPT | Mod: INF

## 2025-08-01 RX ORDER — HEPARIN 100 UNIT/ML
500 SYRINGE INTRAVENOUS AS NEEDED
Status: DISCONTINUED | OUTPATIENT
Start: 2025-08-01 | End: 2025-08-01 | Stop reason: HOSPADM

## 2025-08-01 RX ORDER — EPINEPHRINE 0.3 MG/.3ML
0.3 INJECTION SUBCUTANEOUS EVERY 5 MIN PRN
Status: DISCONTINUED | OUTPATIENT
Start: 2025-08-01 | End: 2025-08-01 | Stop reason: HOSPADM

## 2025-08-01 RX ORDER — PALONOSETRON 0.05 MG/ML
0.25 INJECTION, SOLUTION INTRAVENOUS ONCE
Status: COMPLETED | OUTPATIENT
Start: 2025-08-01 | End: 2025-08-01

## 2025-08-01 RX ORDER — HEPARIN 100 UNIT/ML
500 SYRINGE INTRAVENOUS AS NEEDED
OUTPATIENT
Start: 2025-08-01

## 2025-08-01 RX ORDER — HEPARIN SODIUM,PORCINE/PF 10 UNIT/ML
50 SYRINGE (ML) INTRAVENOUS AS NEEDED
OUTPATIENT
Start: 2025-08-01

## 2025-08-01 RX ORDER — PROCHLORPERAZINE EDISYLATE 5 MG/ML
10 INJECTION INTRAMUSCULAR; INTRAVENOUS EVERY 6 HOURS PRN
Status: DISCONTINUED | OUTPATIENT
Start: 2025-08-01 | End: 2025-08-01 | Stop reason: HOSPADM

## 2025-08-01 RX ORDER — DIPHENHYDRAMINE HYDROCHLORIDE 50 MG/ML
50 INJECTION, SOLUTION INTRAMUSCULAR; INTRAVENOUS AS NEEDED
Status: DISCONTINUED | OUTPATIENT
Start: 2025-08-01 | End: 2025-08-01 | Stop reason: HOSPADM

## 2025-08-01 RX ORDER — FAMOTIDINE 10 MG/ML
20 INJECTION, SOLUTION INTRAVENOUS ONCE AS NEEDED
Status: DISCONTINUED | OUTPATIENT
Start: 2025-08-01 | End: 2025-08-01 | Stop reason: HOSPADM

## 2025-08-01 RX ORDER — PROCHLORPERAZINE MALEATE 10 MG
10 TABLET ORAL EVERY 6 HOURS PRN
Status: DISCONTINUED | OUTPATIENT
Start: 2025-08-01 | End: 2025-08-01 | Stop reason: HOSPADM

## 2025-08-01 RX ORDER — ALBUTEROL SULFATE 0.83 MG/ML
3 SOLUTION RESPIRATORY (INHALATION) AS NEEDED
Status: DISCONTINUED | OUTPATIENT
Start: 2025-08-01 | End: 2025-08-01 | Stop reason: HOSPADM

## 2025-08-01 RX ADMIN — GEMCITABINE 1390.8 MG: 38 INJECTION, SOLUTION INTRAVENOUS at 12:09

## 2025-08-01 RX ADMIN — SODIUM CHLORIDE 1000 ML: 9 INJECTION, SOLUTION INTRAVENOUS at 13:30

## 2025-08-01 RX ADMIN — FOSAPREPITANT 150 MG: 150 INJECTION, POWDER, LYOPHILIZED, FOR SOLUTION INTRAVENOUS at 11:21

## 2025-08-01 RX ADMIN — PALONOSETRON 0.25 MG: 0.05 INJECTION, SOLUTION INTRAVENOUS at 11:22

## 2025-08-01 RX ADMIN — DEXAMETHASONE SODIUM PHOSPHATE 12 MG: 10 INJECTION, SOLUTION INTRAMUSCULAR; INTRAVENOUS at 11:21

## 2025-08-01 RX ADMIN — POTASSIUM CHLORIDE 999 ML/HR: 2 INJECTION, SOLUTION, CONCENTRATE INTRAVENOUS at 10:11

## 2025-08-01 RX ADMIN — SODIUM CHLORIDE 104 MG: 9 INJECTION, SOLUTION INTRAVENOUS at 12:39

## 2025-08-01 ASSESSMENT — PAIN SCALES - GENERAL: PAINLEVEL_OUTOF10: 0-NO PAIN

## 2025-08-01 NOTE — SIGNIFICANT EVENT
08/01/25 1025   Prechemo Checklist   Has the patient been in the hospital, ED, or urgent care since last date of service No   Chemo/Immuno Consent Completed and Signed Yes   Protocol/Indications Verified Yes   Confirmed to previous date/time of medication N/A   Compared to previous dose N/A   All medications are dated accurately Yes   Pregnancy Test Negative Not applicable   Parameters Met Yes   Reasons Parameters Not Met Creatinine Clearance  (obtained ok to treat after clarification with her med/onc team)   Provider Notified Yes   Provider Name Viridiana FERNANDEZ   Is Patient Proceeding With Treatment? Yes   BSA/Weight-Height Verified Yes   Dose Calculations Verified (current, total, cumulative) Yes

## 2025-08-04 ENCOUNTER — APPOINTMENT (OUTPATIENT)
Dept: RADIOLOGY | Facility: HOSPITAL | Age: 65
End: 2025-08-04
Payer: COMMERCIAL

## 2025-08-06 ENCOUNTER — NURSE TRIAGE (OUTPATIENT)
Dept: ADMISSION | Facility: HOSPITAL | Age: 65
End: 2025-08-06
Payer: COMMERCIAL

## 2025-08-06 NOTE — TELEPHONE ENCOUNTER
Lilly aware that provider confirmed this is likely r/t the steroids and will discuss further at next FUV. Patient verbalized understanding and has no further questions or concerns at this time.

## 2025-08-06 NOTE — TELEPHONE ENCOUNTER
Lilly received chemo on 8/1. She states yesterday she felt jittery to the point that she left work. Today it is better though still there slightly.   Other than this she is feeling well.  Educated this is more than likely from the steroids she received, however, I would discuss further with the team.    She did confirm she has been eating and drinking normally.     No other symptoms to report at this time.

## 2025-08-13 ENCOUNTER — TELEMEDICINE (OUTPATIENT)
Dept: SURGERY | Facility: HOSPITAL | Age: 65
End: 2025-08-13
Payer: COMMERCIAL

## 2025-08-13 DIAGNOSIS — C34.92 NSCLC OF LEFT LUNG (MULTI): ICD-10-CM

## 2025-08-13 DIAGNOSIS — F17.200 TOBACCO USE DISORDER: ICD-10-CM

## 2025-08-13 DIAGNOSIS — F17.210 CIGARETTE NICOTINE DEPENDENCE WITHOUT COMPLICATION: ICD-10-CM

## 2025-08-13 PROCEDURE — 99407 BEHAV CHNG SMOKING > 10 MIN: CPT | Performed by: PHYSICIAN ASSISTANT

## 2025-08-13 RX ORDER — DIPHENHYDRAMINE HCL 25 MG
4 CAPSULE ORAL AS NEEDED
Qty: 100 EACH | Refills: 0 | Status: SHIPPED | OUTPATIENT
Start: 2025-08-13 | End: 2025-11-11

## 2025-08-13 RX ORDER — IBUPROFEN 200 MG
1 TABLET ORAL EVERY 24 HOURS
Qty: 90 PATCH | Refills: 0 | Status: SHIPPED | OUTPATIENT
Start: 2025-08-13 | End: 2025-11-11

## 2025-08-27 ENCOUNTER — LAB (OUTPATIENT)
Dept: HEMATOLOGY/ONCOLOGY | Facility: HOSPITAL | Age: 65
End: 2025-08-27
Payer: COMMERCIAL

## 2025-08-27 ENCOUNTER — OFFICE VISIT (OUTPATIENT)
Dept: HEMATOLOGY/ONCOLOGY | Facility: HOSPITAL | Age: 65
End: 2025-08-27
Payer: COMMERCIAL

## 2025-08-27 VITALS
RESPIRATION RATE: 18 BRPM | HEART RATE: 67 BPM | TEMPERATURE: 97.7 F | OXYGEN SATURATION: 100 % | DIASTOLIC BLOOD PRESSURE: 65 MMHG | BODY MASS INDEX: 19.27 KG/M2 | WEIGHT: 105.38 LBS | SYSTOLIC BLOOD PRESSURE: 128 MMHG

## 2025-08-27 DIAGNOSIS — C34.92 NSCLC OF LEFT LUNG (MULTI): Primary | ICD-10-CM

## 2025-08-27 DIAGNOSIS — C34.92 NSCLC OF LEFT LUNG (MULTI): ICD-10-CM

## 2025-08-27 DIAGNOSIS — Z51.11 ENCOUNTER FOR ANTINEOPLASTIC CHEMOTHERAPY: ICD-10-CM

## 2025-08-27 LAB
ALBUMIN SERPL BCP-MCNC: 4.1 G/DL (ref 3.4–5)
ALP SERPL-CCNC: 73 U/L (ref 33–136)
ALT SERPL W P-5'-P-CCNC: 11 U/L (ref 7–45)
ANION GAP SERPL CALC-SCNC: 10 MMOL/L (ref 10–20)
AST SERPL W P-5'-P-CCNC: 15 U/L (ref 9–39)
BASOPHILS # BLD AUTO: 0.07 X10*3/UL (ref 0–0.1)
BASOPHILS NFR BLD AUTO: 1.1 %
BILIRUB SERPL-MCNC: 0.4 MG/DL (ref 0–1.2)
BUN SERPL-MCNC: 13 MG/DL (ref 6–23)
CALCIUM SERPL-MCNC: 9.3 MG/DL (ref 8.6–10.3)
CHLORIDE SERPL-SCNC: 105 MMOL/L (ref 98–107)
CO2 SERPL-SCNC: 27 MMOL/L (ref 21–32)
CREAT SERPL-MCNC: 0.68 MG/DL (ref 0.5–1.05)
EGFRCR SERPLBLD CKD-EPI 2021: >90 ML/MIN/1.73M*2
EOSINOPHIL # BLD AUTO: 0.09 X10*3/UL (ref 0–0.7)
EOSINOPHIL NFR BLD AUTO: 1.4 %
ERYTHROCYTE [DISTWIDTH] IN BLOOD BY AUTOMATED COUNT: 14.3 % (ref 11.5–14.5)
GLUCOSE SERPL-MCNC: 99 MG/DL (ref 74–99)
HCT VFR BLD AUTO: 37.9 % (ref 36–46)
HGB BLD-MCNC: 12.9 G/DL (ref 12–16)
IMM GRANULOCYTES # BLD AUTO: 0.05 X10*3/UL (ref 0–0.7)
IMM GRANULOCYTES NFR BLD AUTO: 0.8 % (ref 0–0.9)
LYMPHOCYTES # BLD AUTO: 2.43 X10*3/UL (ref 1.2–4.8)
LYMPHOCYTES NFR BLD AUTO: 38.8 %
MAGNESIUM SERPL-MCNC: 2.03 MG/DL (ref 1.6–2.4)
MCH RBC QN AUTO: 31.3 PG (ref 26–34)
MCHC RBC AUTO-ENTMCNC: 34 G/DL (ref 32–36)
MCV RBC AUTO: 92 FL (ref 80–100)
MONOCYTES # BLD AUTO: 0.77 X10*3/UL (ref 0.1–1)
MONOCYTES NFR BLD AUTO: 12.3 %
NEUTROPHILS # BLD AUTO: 2.86 X10*3/UL (ref 1.2–7.7)
NEUTROPHILS NFR BLD AUTO: 45.6 %
NRBC BLD-RTO: 0 /100 WBCS (ref 0–0)
PLATELET # BLD AUTO: 326 X10*3/UL (ref 150–450)
POTASSIUM SERPL-SCNC: 4.1 MMOL/L (ref 3.5–5.3)
PROT SERPL-MCNC: 6.5 G/DL (ref 6.4–8.2)
RBC # BLD AUTO: 4.12 X10*6/UL (ref 4–5.2)
SODIUM SERPL-SCNC: 138 MMOL/L (ref 136–145)
WBC # BLD AUTO: 6.3 X10*3/UL (ref 4.4–11.3)

## 2025-08-27 PROCEDURE — 1126F AMNT PAIN NOTED NONE PRSNT: CPT | Performed by: NURSE PRACTITIONER

## 2025-08-27 PROCEDURE — 4004F PT TOBACCO SCREEN RCVD TLK: CPT | Performed by: NURSE PRACTITIONER

## 2025-08-27 PROCEDURE — 3074F SYST BP LT 130 MM HG: CPT | Performed by: NURSE PRACTITIONER

## 2025-08-27 PROCEDURE — 80053 COMPREHEN METABOLIC PANEL: CPT

## 2025-08-27 PROCEDURE — 85025 COMPLETE CBC W/AUTO DIFF WBC: CPT

## 2025-08-27 PROCEDURE — 99214 OFFICE O/P EST MOD 30 MIN: CPT | Performed by: NURSE PRACTITIONER

## 2025-08-27 PROCEDURE — 3078F DIAST BP <80 MM HG: CPT | Performed by: NURSE PRACTITIONER

## 2025-08-27 PROCEDURE — 83735 ASSAY OF MAGNESIUM: CPT

## 2025-08-27 PROCEDURE — 2500000004 HC RX 250 GENERAL PHARMACY W/ HCPCS (ALT 636 FOR OP/ED): Performed by: NURSE PRACTITIONER

## 2025-08-27 RX ORDER — PROCHLORPERAZINE MALEATE 10 MG
10 TABLET ORAL EVERY 6 HOURS PRN
OUTPATIENT
Start: 2025-09-05

## 2025-08-27 RX ORDER — EPINEPHRINE 0.3 MG/.3ML
0.3 INJECTION SUBCUTANEOUS EVERY 5 MIN PRN
Status: CANCELLED | OUTPATIENT
Start: 2025-08-29

## 2025-08-27 RX ORDER — FAMOTIDINE 10 MG/ML
20 INJECTION, SOLUTION INTRAVENOUS ONCE AS NEEDED
Status: CANCELLED | OUTPATIENT
Start: 2025-08-29

## 2025-08-27 RX ORDER — PROCHLORPERAZINE EDISYLATE 5 MG/ML
10 INJECTION INTRAMUSCULAR; INTRAVENOUS EVERY 6 HOURS PRN
Status: CANCELLED | OUTPATIENT
Start: 2025-08-29

## 2025-08-27 RX ORDER — ONDANSETRON HYDROCHLORIDE 2 MG/ML
8 INJECTION, SOLUTION INTRAVENOUS ONCE
OUTPATIENT
Start: 2025-09-05

## 2025-08-27 RX ORDER — DIPHENHYDRAMINE HYDROCHLORIDE 50 MG/ML
50 INJECTION, SOLUTION INTRAMUSCULAR; INTRAVENOUS AS NEEDED
OUTPATIENT
Start: 2025-09-05

## 2025-08-27 RX ORDER — ALBUTEROL SULFATE 0.83 MG/ML
3 SOLUTION RESPIRATORY (INHALATION) AS NEEDED
OUTPATIENT
Start: 2025-09-05

## 2025-08-27 RX ORDER — PALONOSETRON 0.05 MG/ML
0.25 INJECTION, SOLUTION INTRAVENOUS ONCE
Status: CANCELLED | OUTPATIENT
Start: 2025-08-29

## 2025-08-27 RX ORDER — EPINEPHRINE 0.3 MG/.3ML
0.3 INJECTION SUBCUTANEOUS EVERY 5 MIN PRN
OUTPATIENT
Start: 2025-09-05

## 2025-08-27 RX ORDER — DIPHENHYDRAMINE HYDROCHLORIDE 50 MG/ML
50 INJECTION, SOLUTION INTRAMUSCULAR; INTRAVENOUS AS NEEDED
Status: CANCELLED | OUTPATIENT
Start: 2025-08-29

## 2025-08-27 RX ORDER — ALBUTEROL SULFATE 0.83 MG/ML
3 SOLUTION RESPIRATORY (INHALATION) AS NEEDED
Status: CANCELLED | OUTPATIENT
Start: 2025-08-29

## 2025-08-27 RX ORDER — HEPARIN 100 UNIT/ML
500 SYRINGE INTRAVENOUS AS NEEDED
OUTPATIENT
Start: 2025-08-27

## 2025-08-27 RX ORDER — HEPARIN 100 UNIT/ML
500 SYRINGE INTRAVENOUS AS NEEDED
Status: DISCONTINUED | OUTPATIENT
Start: 2025-08-27 | End: 2025-08-27 | Stop reason: HOSPADM

## 2025-08-27 RX ORDER — PROCHLORPERAZINE EDISYLATE 5 MG/ML
10 INJECTION INTRAMUSCULAR; INTRAVENOUS EVERY 6 HOURS PRN
OUTPATIENT
Start: 2025-09-05

## 2025-08-27 RX ORDER — HEPARIN SODIUM,PORCINE/PF 10 UNIT/ML
50 SYRINGE (ML) INTRAVENOUS AS NEEDED
OUTPATIENT
Start: 2025-08-27

## 2025-08-27 RX ORDER — FAMOTIDINE 10 MG/ML
20 INJECTION, SOLUTION INTRAVENOUS ONCE AS NEEDED
OUTPATIENT
Start: 2025-09-05

## 2025-08-27 RX ORDER — PROCHLORPERAZINE MALEATE 10 MG
10 TABLET ORAL EVERY 6 HOURS PRN
Status: CANCELLED | OUTPATIENT
Start: 2025-08-29

## 2025-08-27 RX ADMIN — HEPARIN 500 UNITS: 100 SYRINGE at 08:33

## 2025-08-27 ASSESSMENT — ENCOUNTER SYMPTOMS
HEADACHES: 0
APPETITE CHANGE: 0
COUGH: 0
SHORTNESS OF BREATH: 0
DECREASED CONCENTRATION: 1
BACK PAIN: 0
DIARRHEA: 0
EYE PROBLEMS: 0
CONSTIPATION: 0
DIFFICULTY URINATING: 0
ADENOPATHY: 0
DIZZINESS: 0
ARTHRALGIAS: 0
ABDOMINAL PAIN: 0
VOMITING: 0
NAUSEA: 0
FATIGUE: 1
UNEXPECTED WEIGHT CHANGE: 0
LEG SWELLING: 0

## 2025-08-27 ASSESSMENT — PAIN SCALES - GENERAL: PAINLEVEL_OUTOF10: 0-NO PAIN

## 2025-08-29 ENCOUNTER — INFUSION (OUTPATIENT)
Dept: HEMATOLOGY/ONCOLOGY | Facility: CLINIC | Age: 65
End: 2025-08-29
Payer: COMMERCIAL

## 2025-08-29 ENCOUNTER — SOCIAL WORK (OUTPATIENT)
Dept: HEMATOLOGY/ONCOLOGY | Facility: CLINIC | Age: 65
End: 2025-08-29
Payer: COMMERCIAL

## 2025-08-29 VITALS
WEIGHT: 106.04 LBS | RESPIRATION RATE: 18 BRPM | SYSTOLIC BLOOD PRESSURE: 132 MMHG | BODY MASS INDEX: 19.51 KG/M2 | DIASTOLIC BLOOD PRESSURE: 73 MMHG | HEIGHT: 62 IN | TEMPERATURE: 97.5 F | HEART RATE: 81 BPM

## 2025-08-29 DIAGNOSIS — C34.92 NSCLC OF LEFT LUNG (MULTI): ICD-10-CM

## 2025-08-29 DIAGNOSIS — C34.92 NSCLC OF LEFT LUNG (MULTI): Primary | ICD-10-CM

## 2025-08-29 PROCEDURE — 96368 THER/DIAG CONCURRENT INF: CPT

## 2025-08-29 PROCEDURE — 96360 HYDRATION IV INFUSION INIT: CPT | Mod: INF

## 2025-08-29 PROCEDURE — 96365 THER/PROPH/DIAG IV INF INIT: CPT | Mod: INF

## 2025-08-29 PROCEDURE — 96375 TX/PRO/DX INJ NEW DRUG ADDON: CPT | Mod: INF

## 2025-08-29 PROCEDURE — 2500000004 HC RX 250 GENERAL PHARMACY W/ HCPCS (ALT 636 FOR OP/ED): Performed by: NURSE PRACTITIONER

## 2025-08-29 PROCEDURE — 96374 THER/PROPH/DIAG INJ IV PUSH: CPT | Mod: INF

## 2025-08-29 PROCEDURE — 96413 CHEMO IV INFUSION 1 HR: CPT

## 2025-08-29 PROCEDURE — 96417 CHEMO IV INFUS EACH ADDL SEQ: CPT

## 2025-08-29 RX ORDER — PALONOSETRON 0.05 MG/ML
0.25 INJECTION, SOLUTION INTRAVENOUS ONCE
Status: COMPLETED | OUTPATIENT
Start: 2025-08-29 | End: 2025-08-29

## 2025-08-29 RX ORDER — DIPHENHYDRAMINE HYDROCHLORIDE 50 MG/ML
50 INJECTION, SOLUTION INTRAMUSCULAR; INTRAVENOUS AS NEEDED
Status: DISCONTINUED | OUTPATIENT
Start: 2025-08-29 | End: 2025-08-29 | Stop reason: HOSPADM

## 2025-08-29 RX ORDER — EPINEPHRINE 0.3 MG/.3ML
0.3 INJECTION SUBCUTANEOUS EVERY 5 MIN PRN
Status: DISCONTINUED | OUTPATIENT
Start: 2025-08-29 | End: 2025-08-29 | Stop reason: HOSPADM

## 2025-08-29 RX ORDER — FAMOTIDINE 10 MG/ML
20 INJECTION, SOLUTION INTRAVENOUS ONCE AS NEEDED
Status: DISCONTINUED | OUTPATIENT
Start: 2025-08-29 | End: 2025-08-29 | Stop reason: HOSPADM

## 2025-08-29 RX ORDER — ALBUTEROL SULFATE 0.83 MG/ML
3 SOLUTION RESPIRATORY (INHALATION) AS NEEDED
Status: DISCONTINUED | OUTPATIENT
Start: 2025-08-29 | End: 2025-08-29 | Stop reason: HOSPADM

## 2025-08-29 RX ADMIN — POTASSIUM CHLORIDE 999 ML/HR: 2 INJECTION, SOLUTION, CONCENTRATE INTRAVENOUS at 09:14

## 2025-08-29 RX ADMIN — SODIUM CHLORIDE 1000 ML: 0.9 INJECTION, SOLUTION INTRAVENOUS at 11:56

## 2025-08-29 RX ADMIN — PALONOSETRON 0.25 MG: 0.05 INJECTION, SOLUTION INTRAVENOUS at 10:24

## 2025-08-29 RX ADMIN — SODIUM CHLORIDE 104 MG: 9 INJECTION, SOLUTION INTRAVENOUS at 11:53

## 2025-08-29 RX ADMIN — DEXAMETHASONE SODIUM PHOSPHATE 12 MG: 10 INJECTION, SOLUTION INTRAMUSCULAR; INTRAVENOUS at 10:24

## 2025-08-29 RX ADMIN — GEMCITABINE 1390.8 MG: 38 INJECTION, SOLUTION INTRAVENOUS at 11:12

## 2025-08-29 RX ADMIN — FOSAPREPITANT 150 MG: 150 INJECTION, POWDER, LYOPHILIZED, FOR SOLUTION INTRAVENOUS at 10:24

## 2025-08-29 ASSESSMENT — PAIN SCALES - GENERAL: PAINLEVEL_OUTOF10: 0-NO PAIN

## 2025-09-04 ENCOUNTER — INFUSION (OUTPATIENT)
Dept: HEMATOLOGY/ONCOLOGY | Facility: CLINIC | Age: 65
End: 2025-09-04
Payer: COMMERCIAL

## 2025-09-04 DIAGNOSIS — C34.92 NSCLC OF LEFT LUNG (MULTI): ICD-10-CM

## 2025-09-04 LAB
BASOPHILS # BLD AUTO: 0.02 X10*3/UL (ref 0–0.1)
BASOPHILS NFR BLD AUTO: 0.2 %
EOSINOPHIL # BLD AUTO: 0.04 X10*3/UL (ref 0–0.7)
EOSINOPHIL NFR BLD AUTO: 0.4 %
ERYTHROCYTE [DISTWIDTH] IN BLOOD BY AUTOMATED COUNT: 13.9 % (ref 11.5–14.5)
HCT VFR BLD AUTO: 35.8 % (ref 36–46)
HGB BLD-MCNC: 12.1 G/DL (ref 12–16)
IMM GRANULOCYTES # BLD AUTO: 0.02 X10*3/UL (ref 0–0.7)
IMM GRANULOCYTES NFR BLD AUTO: 0.2 % (ref 0–0.9)
LYMPHOCYTES # BLD AUTO: 5.06 X10*3/UL (ref 1.2–4.8)
LYMPHOCYTES NFR BLD AUTO: 56.3 %
MCH RBC QN AUTO: 31.8 PG (ref 26–34)
MCHC RBC AUTO-ENTMCNC: 33.8 G/DL (ref 32–36)
MCV RBC AUTO: 94 FL (ref 80–100)
MONOCYTES # BLD AUTO: 0.29 X10*3/UL (ref 0.1–1)
MONOCYTES NFR BLD AUTO: 3.2 %
NEUTROPHILS # BLD AUTO: 3.55 X10*3/UL (ref 1.2–7.7)
NEUTROPHILS NFR BLD AUTO: 39.7 %
NRBC BLD-RTO: 0 /100 WBCS (ref 0–0)
PLATELET # BLD AUTO: 200 X10*3/UL (ref 150–450)
RBC # BLD AUTO: 3.8 X10*6/UL (ref 4–5.2)
WBC # BLD AUTO: 9 X10*3/UL (ref 4.4–11.3)

## 2025-09-04 PROCEDURE — 36591 DRAW BLOOD OFF VENOUS DEVICE: CPT

## 2025-09-04 PROCEDURE — 85025 COMPLETE CBC W/AUTO DIFF WBC: CPT

## 2025-09-04 PROCEDURE — 96523 IRRIG DRUG DELIVERY DEVICE: CPT

## 2025-09-04 PROCEDURE — 2500000004 HC RX 250 GENERAL PHARMACY W/ HCPCS (ALT 636 FOR OP/ED): Performed by: NURSE PRACTITIONER

## 2025-09-04 RX ORDER — HEPARIN 100 UNIT/ML
500 SYRINGE INTRAVENOUS AS NEEDED
Status: DISCONTINUED | OUTPATIENT
Start: 2025-09-04 | End: 2025-09-04 | Stop reason: HOSPADM

## 2025-09-04 RX ORDER — HEPARIN 100 UNIT/ML
500 SYRINGE INTRAVENOUS AS NEEDED
OUTPATIENT
Start: 2025-09-04

## 2025-09-04 RX ORDER — HEPARIN SODIUM,PORCINE/PF 10 UNIT/ML
50 SYRINGE (ML) INTRAVENOUS AS NEEDED
OUTPATIENT
Start: 2025-09-04

## 2025-09-04 RX ADMIN — HEPARIN 500 UNITS: 100 SYRINGE at 12:50

## 2025-09-05 ENCOUNTER — INFUSION (OUTPATIENT)
Dept: HEMATOLOGY/ONCOLOGY | Facility: CLINIC | Age: 65
End: 2025-09-05
Payer: COMMERCIAL

## 2025-09-05 ENCOUNTER — TELEPHONE (OUTPATIENT)
Dept: ADMISSION | Facility: HOSPITAL | Age: 65
End: 2025-09-05
Payer: COMMERCIAL

## 2025-09-05 VITALS
BODY MASS INDEX: 19.03 KG/M2 | WEIGHT: 103.4 LBS | OXYGEN SATURATION: 98 % | RESPIRATION RATE: 18 BRPM | TEMPERATURE: 98.8 F | HEIGHT: 62 IN | DIASTOLIC BLOOD PRESSURE: 62 MMHG | SYSTOLIC BLOOD PRESSURE: 136 MMHG | HEART RATE: 73 BPM

## 2025-09-05 DIAGNOSIS — C34.92 NSCLC OF LEFT LUNG (MULTI): ICD-10-CM

## 2025-09-05 PROCEDURE — 2500000004 HC RX 250 GENERAL PHARMACY W/ HCPCS (ALT 636 FOR OP/ED): Mod: JW | Performed by: NURSE PRACTITIONER

## 2025-09-05 PROCEDURE — 96413 CHEMO IV INFUSION 1 HR: CPT

## 2025-09-05 PROCEDURE — 96523 IRRIG DRUG DELIVERY DEVICE: CPT

## 2025-09-05 PROCEDURE — 96374 THER/PROPH/DIAG INJ IV PUSH: CPT | Mod: INF

## 2025-09-05 RX ORDER — DIPHENHYDRAMINE HYDROCHLORIDE 50 MG/ML
50 INJECTION, SOLUTION INTRAMUSCULAR; INTRAVENOUS AS NEEDED
Status: DISCONTINUED | OUTPATIENT
Start: 2025-09-05 | End: 2025-09-05 | Stop reason: HOSPADM

## 2025-09-05 RX ORDER — FAMOTIDINE 10 MG/ML
20 INJECTION, SOLUTION INTRAVENOUS ONCE AS NEEDED
Status: DISCONTINUED | OUTPATIENT
Start: 2025-09-05 | End: 2025-09-05 | Stop reason: HOSPADM

## 2025-09-05 RX ORDER — ALBUTEROL SULFATE 0.83 MG/ML
3 SOLUTION RESPIRATORY (INHALATION) AS NEEDED
Status: DISCONTINUED | OUTPATIENT
Start: 2025-09-05 | End: 2025-09-05 | Stop reason: HOSPADM

## 2025-09-05 RX ORDER — EPINEPHRINE 0.3 MG/.3ML
0.3 INJECTION SUBCUTANEOUS EVERY 5 MIN PRN
Status: DISCONTINUED | OUTPATIENT
Start: 2025-09-05 | End: 2025-09-05 | Stop reason: HOSPADM

## 2025-09-05 RX ORDER — ONDANSETRON HYDROCHLORIDE 2 MG/ML
8 INJECTION, SOLUTION INTRAVENOUS ONCE
Status: COMPLETED | OUTPATIENT
Start: 2025-09-05 | End: 2025-09-05

## 2025-09-05 RX ADMIN — ONDANSETRON 8 MG: 2 INJECTION INTRAMUSCULAR; INTRAVENOUS at 14:15

## 2025-09-05 RX ADMIN — GEMCITABINE 1390.8 MG: 38 INJECTION, SOLUTION INTRAVENOUS at 14:32

## 2025-09-05 ASSESSMENT — PAIN SCALES - GENERAL: PAINLEVEL_OUTOF10: 0-NO PAIN

## 2025-10-08 ENCOUNTER — APPOINTMENT (OUTPATIENT)
Dept: HEMATOLOGY/ONCOLOGY | Facility: HOSPITAL | Age: 65
End: 2025-10-08
Payer: COMMERCIAL

## (undated) DEVICE — RELOAD, SUREFORM 45, 4.3 GREEN

## (undated) DEVICE — DRESSING, ADHESIVE, ISLAND, TELFA, 2 X 3.75 IN, LF

## (undated) DEVICE — COLLECTION UNIT, DRAINAGE, THORACIC, SINGLE TUBE, DRY SUCTION, ATS COMPATIBLE, OASIS 3600, LF

## (undated) DEVICE — CATHETER, URETHRAL, ROBNEL, 10 FR,16 IN, LF, RED

## (undated) DEVICE — RELOAD, SUREFORM 45, 2.5 WHITE

## (undated) DEVICE — COVER, CART, 45 X 27 X 48 IN, CLEAR

## (undated) DEVICE — GAUZE, KITTNER ROLL, STERILE

## (undated) DEVICE — DRESSING, TRANSPARENT, TEGADERM, 4 X 4-3/4 IN

## (undated) DEVICE — SUTURE, ETHIBOND, XTRA, 30 IN, 0, CT-1, GREEN

## (undated) DEVICE — CATHETER TRAY, SURESTEP, 16FR, URINE METER W/STATLOCK

## (undated) DEVICE — ELECTRODE, ELECTROSURGICAL, BLADE, INSULATED, ENT/IMA, STERILE

## (undated) DEVICE — TUBE SET, INSUFFLATION, SMOKE EVAC, DAVINCI

## (undated) DEVICE — GRASPER, LONG, BIPOLAR, DAVINCI XI

## (undated) DEVICE — MANIFOLD, 4 PORT NEPTUNE STANDARD

## (undated) DEVICE — DRAPE, INCISE, ANTIMICROBIAL, IOBAN 2, LARGE, 17 X 23 IN, DISPOSABLE, STERILE

## (undated) DEVICE — Device

## (undated) DEVICE — CATHETER, THORACIC, STRAIGHT, ADULT, 28 FR, PVC

## (undated) DEVICE — KIT, ROBOTIC, CUSTOM UHC

## (undated) DEVICE — TUBING, SUCTION, CONNECTING, STERILE 0.25 X 120 IN., LF

## (undated) DEVICE — SUTURE, MONOCRYL, 3-0, 18 IN, PS2, UNDYED

## (undated) DEVICE — GRASPER, FENESTRATED TIP-UP XI

## (undated) DEVICE — RELOAD, SUREFORM 45, 3.5 BLUE

## (undated) DEVICE — DRAPE, ARM XI

## (undated) DEVICE — TOWEL, SURGICAL, NEURO, O/R, 16 X 26, BLUE, STERILE

## (undated) DEVICE — DRAPE, TIBURON, SPLIT SHEET, REINF ADH STRIP, 77X122

## (undated) DEVICE — DRAPE, COLUMN, DAVINCI XI

## (undated) DEVICE — FORCEPS, CADIERE, DAVINCI XI

## (undated) DEVICE — SHEET, SPLIT, CARDIOVASCULAR TIBURON

## (undated) DEVICE — SUTURE, VICRYL, 2-0, 36 IN, CT-1, UNDYED

## (undated) DEVICE — LOOP, VESSEL, MAXI, RED

## (undated) DEVICE — STRIP, SKIN CLOSURE, STERI STRIP, REINFORCED, 0.5 X 4 IN

## (undated) DEVICE — DRESSING, SPONGE, GAUZE, CURITY, 4 X 4 IN, STERILE

## (undated) DEVICE — STAPLER, SUREFORM 45, CURVED TIP

## (undated) DEVICE — SEAL, UNIVERSAL, 5-12MM

## (undated) DEVICE — OBTURATOR, BLADELESS , SU